# Patient Record
Sex: FEMALE | Race: WHITE | NOT HISPANIC OR LATINO | Employment: UNEMPLOYED | URBAN - METROPOLITAN AREA
[De-identification: names, ages, dates, MRNs, and addresses within clinical notes are randomized per-mention and may not be internally consistent; named-entity substitution may affect disease eponyms.]

---
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
## 2022-02-11 ENCOUNTER — OFFICE VISIT (OUTPATIENT)
Dept: FAMILY MEDICINE CLINIC | Facility: HOSPITAL | Age: 22
End: 2022-02-11
Payer: COMMERCIAL

## 2022-02-11 VITALS
BODY MASS INDEX: 22.09 KG/M2 | DIASTOLIC BLOOD PRESSURE: 58 MMHG | HEART RATE: 58 BPM | SYSTOLIC BLOOD PRESSURE: 92 MMHG | HEIGHT: 65 IN | WEIGHT: 132.6 LBS | TEMPERATURE: 98 F

## 2022-02-11 DIAGNOSIS — Z00.00 ANNUAL PHYSICAL EXAM: Primary | ICD-10-CM

## 2022-02-11 PROBLEM — M22.2X2 PATELLOFEMORAL DISORDER OF LEFT KNEE: Status: RESOLVED | Noted: 2019-10-11 | Resolved: 2022-02-11

## 2022-02-11 PROBLEM — S83.005A CLOSED DISLOCATION OF LEFT PATELLA: Status: RESOLVED | Noted: 2019-06-20 | Resolved: 2022-02-11

## 2022-02-11 PROCEDURE — 1036F TOBACCO NON-USER: CPT | Performed by: INTERNAL MEDICINE

## 2022-02-11 PROCEDURE — 3725F SCREEN DEPRESSION PERFORMED: CPT | Performed by: INTERNAL MEDICINE

## 2022-02-11 PROCEDURE — 3008F BODY MASS INDEX DOCD: CPT | Performed by: INTERNAL MEDICINE

## 2022-02-11 PROCEDURE — 99385 PREV VISIT NEW AGE 18-39: CPT | Performed by: INTERNAL MEDICINE

## 2022-02-11 NOTE — PATIENT INSTRUCTIONS

## 2022-02-11 NOTE — PROGRESS NOTES
[facility] [address]     NAME: Jose Miguel Gan  AGE: 25 y o  SEX: female  : 2000     DATE: 2022     Assessment and Plan:     Problem List Items Addressed This Visit     None      Visit Diagnoses     Annual physical exam    -  Primary          Immunizations and preventive care screenings were discussed with patient today  Appropriate education was printed on patient's after visit summary  Counseling:  Alcohol/drug use: discussed moderation in alcohol intake, the recommendations for healthy alcohol use, and avoidance of illicit drug use  Dental Health: discussed importance of regular tooth brushing, flossing, and dental visits  Injury prevention: discussed safety/seat belts, safety helmets, smoke detectors, carbon dioxide detectors, and smoking near bedding or upholstery  · Exercise: the importance of regular exercise/physical activity was discussed  Recommend exercise 3-5 times per week for at least 30 minutes  Depression Screening and Follow-up Plan: Patient was screened for depression during today's encounter  They screened negative with a PHQ-2 score of 0  Return in about 1 year (around 2023) for Annual physical      Chief Complaint:     Chief Complaint   Patient presents with    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    Diet and Physical Activity  · Diet/Nutrition: well balanced diet, limited junk food and consuming 3-5 servings of fruits/vegetables daily  · Exercise: does weights and a [employer] track        Depression Screening  PHQ-2/9 Depression Screening    Little interest or pleasure in doing things: 0 - not at all  Feeling down, depressed, or hopeless: 0 - not at all  PHQ-2 Score: 0  PHQ-2 Interpretation: Negative depression screen       General Health  · Sleep: sleeps well  · Hearing: normal - bilateral   · Vision: goes for regular eye exams and wears glasses  · Dental: regular dental visits, brushes teeth twice daily and flosses teeth occasionally  /GYN Health  · Last menstrual period: Jan 18th 2022 and regular  · Contraceptive method: oral contraceptives  · History of STDs?: no      Review of Systems:     Review of Systems   Constitutional: Negative for chills, fatigue, fever and unexpected weight change  HENT: Negative for congestion, hearing loss and trouble swallowing  Eyes: Negative for pain  Respiratory: Negative for cough, shortness of breath and wheezing  Cardiovascular: Negative for chest pain, palpitations and leg swelling  Gastrointestinal: Negative for abdominal pain, constipation, diarrhea, nausea and vomiting  Endocrine: Negative for polydipsia and polyuria  Genitourinary: Negative for difficulty urinating, dysuria and menstrual problem  Musculoskeletal: Negative for back pain and neck pain  Skin: Negative for rash and wound  Neurological: Negative for dizziness, light-headedness and headaches  Psychiatric/Behavioral: Negative for dysphoric mood and sleep disturbance  Past Medical History:     Past Medical History:   Diagnosis Date    Closed dislocation of left patella 6/20/2019    [facility] 10/19 No surgery recommended  PT, return in 8 weeks      Past Surgical History:     History reviewed  No pertinent surgical history     Social History:     Social History     Socioeconomic History    Marital status: Single     Spouse name: None    Number of children: None    Years of education: None    Highest education level: None   Occupational History    Occupation: student   Tobacco Use    Smoking status: Never Smoker    Smokeless tobacco: Never Used   Vaping Use    Vaping Use: Never used   Substance and Sexual Activity    Alcohol use: Never    Drug use: Never  Sexual activity: Yes     Partners: Male     Birth control/protection: Pill   Other Topics Concern    None   Social History Narrative    None     Social Determinants of Health     Financial Resource Strain: Not on file   Food Insecurity: Not on file   Transportation Needs: Not on file   Physical Activity: Not on file   Stress: Not on file   Social Connections: Not on file   Intimate Partner Violence: Not on file   Housing Stability: Not on file      Family History:     Family History   Problem Relation Age of Onset    Migraines Mother     Hyperlipidemia Father     Seizures Father     Atrial fibrillation Father     Asthma Maternal Grandmother     Anxiety disorder Maternal Grandmother     Asthma Maternal Grandfather     Clotting disorder Maternal Grandfather     Heart disease Maternal Grandfather     Leukemia Maternal Grandfather     Osteoporosis Maternal Grandfather     Hypertension Paternal Grandfather     Thyroid disease Paternal Grandfather     Depression Other     Diabetes Other     Vesicoureteral reflux Other     No Known Problems Brother     Depression Paternal Grandmother       Current Medications:     Current Outpatient Medications   Medication Sig Dispense Refill    norethindrone-ethinyl estradiol-iron (MICROGESTIN FE1 5/30) 1 5-30 MG-MCG tablet Take 1 tablet by mouth daily 84 tablet 3     No current facility-administered medications for this visit  Allergies:     No Known Allergies   Physical Exam:     BP 92/58   Pulse 58   Temp 98 °F (36 7 °C) (Tympanic)   Ht 5' 5" (1 651 m)   Wt 60 1 kg (132 lb 9 6 oz)   BMI 22 07 kg/m²     Physical Exam  Vitals and nursing note reviewed  Constitutional:       General: She is not in acute distress  Appearance: She is well-developed  She is not ill-appearing  HENT:      Head: Normocephalic and atraumatic  Right Ear: Tympanic membrane and external ear normal  There is no impacted cerumen        Left Ear: Tympanic membrane and external ear normal  There is no impacted cerumen  Eyes:      General:         Right eye: No discharge  Left eye: No discharge  Conjunctiva/sclera: Conjunctivae normal    Neck:      Thyroid: No thyromegaly  Trachea: No tracheal deviation  Comments: No thyroid nodules/masses noted  Cardiovascular:      Rate and Rhythm: Normal rate and regular rhythm  Heart sounds: Normal heart sounds  No murmur heard  Pulmonary:      Effort: Pulmonary effort is normal  No respiratory distress  Breath sounds: Normal breath sounds  No wheezing, rhonchi or rales  Abdominal:      General: There is no distension  Palpations: Abdomen is soft  Tenderness: There is no abdominal tenderness  There is no guarding or rebound  Musculoskeletal:         General: No deformity or signs of injury  Cervical back: Neck supple  Lymphadenopathy:      Cervical: No cervical adenopathy  Skin:     General: Skin is warm and dry  Coloration: Skin is not pale  Findings: No rash  Neurological:      General: No focal deficit present  Mental Status: She is alert  Motor: No abnormal muscle tone  Gait: Gait normal    Psychiatric:         Mood and Affect: Mood normal          Behavior: Behavior normal          Thought Content:  Thought content normal          Judgment: Judgment normal           Petra Res, DO   [address] [place]

## 2022-10-11 ENCOUNTER — TELEPHONE (OUTPATIENT)
Dept: OBGYN CLINIC | Facility: CLINIC | Age: 22
End: 2022-10-11

## 2022-10-11 DIAGNOSIS — Z30.41 ENCOUNTER FOR SURVEILLANCE OF CONTRACEPTIVE PILLS: ICD-10-CM

## 2022-10-12 ENCOUNTER — OFFICE VISIT (OUTPATIENT)
Dept: GASTROENTEROLOGY | Facility: CLINIC | Age: 22
End: 2022-10-12
Payer: COMMERCIAL

## 2022-10-12 ENCOUNTER — TELEPHONE (OUTPATIENT)
Dept: GASTROENTEROLOGY | Facility: CLINIC | Age: 22
End: 2022-10-12

## 2022-10-12 VITALS
WEIGHT: 132 LBS | DIASTOLIC BLOOD PRESSURE: 68 MMHG | SYSTOLIC BLOOD PRESSURE: 112 MMHG | BODY MASS INDEX: 21.99 KG/M2 | HEIGHT: 65 IN

## 2022-10-12 DIAGNOSIS — R19.8 RECTAL FULLNESS: ICD-10-CM

## 2022-10-12 DIAGNOSIS — K62.5 RECTAL BLEEDING: Primary | ICD-10-CM

## 2022-10-12 PROCEDURE — 99204 OFFICE O/P NEW MOD 45 MIN: CPT | Performed by: INTERNAL MEDICINE

## 2022-10-12 RX ORDER — NORETHINDRONE ACETATE AND ETHINYL ESTRADIOL 1.5-30(21)
1 KIT ORAL DAILY
Qty: 84 TABLET | Refills: 0 | Status: SHIPPED | OUTPATIENT
Start: 2022-10-12 | End: 2022-10-12

## 2022-10-12 RX ORDER — NORETHINDRONE ACETATE/ETHINYL ESTRADIOL AND FERROUS FUMARATE 1.5-30(21)
KIT ORAL
Qty: 84 TABLET | Refills: 3 | Status: SHIPPED | OUTPATIENT
Start: 2022-10-12

## 2022-10-12 RX ORDER — HYDROCORTISONE ACETATE 25 MG/1
25 SUPPOSITORY RECTAL 2 TIMES DAILY
Qty: 12 SUPPOSITORY | Refills: 0 | Status: SHIPPED | OUTPATIENT
Start: 2022-10-12

## 2022-10-12 NOTE — PROGRESS NOTES
[facility] - Outpatient Consultation  Stacy Dugan 25 y o  female MRN: [id number]  Encounter: [id number]    ASSESSMENT AND PLAN:      1  Rectal bleeding  This is a 68-year-old female referred to us by Dr Lino Lambert,  for rectal bleeding  She is here today with her mom as a new patient  Two days of bright red blood per rectum, sounds hemorrhoidal   No family history of colon cancer or inflammatory bowel disease  Will need to rule out any other rectal anal pathology given the constant sensation of rectal fullness  - avoid pushing and straining  - okay to try some Anusol for now  - will schedule colonoscopy  - add fiber and increased water    - hydrocortisone (ANUSOL-HC) 25 mg suppository; Insert 1 suppository (25 mg total) into the rectum 2 (two) times a day  Dispense: 12 suppository; Refill: 0  - Schedule Colonoscopy @ [address] [place] [address] Ox-Cit Acd 10-3 5-12 MG-GM -GM/160ML SOLN; Take 160 mL by mouth once for 1 dose Take 160 mL by mouth once for 2 doses as instructed  Dispense: 320 mL; Refill: 0    2  Rectal fullness      Followup Appointment:  6 weeks pending colonoscopy  ______________________________________________________________________    Chief Complaint   Patient presents with   • Rectal bleeding, hard stools     Referred by Dr Sunita Mar         HPI:   Stacy Dugan is a 25y o  year old female who presents today as a new patient at the request of her PCP for rectal bleeding  She is here today with her mom who is 1 of our nurse practitioners in the hematology department  Patient states that at baseline, she is healthy  Denies any issues with heartburn, dysphagia, nausea vomiting, abdominal pains  No chronic diarrhea or weight loss  Eats well  She is a college student at [employer]  Occasionally gets hard stools requiring some straining but normally moves her bowels regularly  In the past, she has seen some pink blood in the toilet paper or covered on her stool  However, yesterday she moves her bowels and there was significantly more amount of red blood in the toilet, on the toilet paper  Almost look like menstrual bleeding  Slightly improved today but still happened again  No abdominal pains associated with it  No recent sicknesses or diarrhea  No stress at school  Historical Information   Past Medical History:   Diagnosis Date   • Closed dislocation of left patella 6/20/2019    [facility] 10/[facility] No surgery recommended  PT, return in 8 weeks     History reviewed  No pertinent surgical history  Social History     Substance and Sexual Activity   Alcohol Use Never     Social History     Substance and Sexual Activity   Drug Use Never     Social History     Tobacco Use   Smoking Status Never Smoker   Smokeless Tobacco Never Used     Family History   Problem Relation Age of Onset   • Migraines Mother    • Hyperlipidemia Father    • Seizures Father    • Atrial fibrillation Father    • No Known Problems Brother    • Asthma Maternal Grandmother    • Anxiety disorder Maternal Grandmother    • Asthma Maternal Grandfather    • Clotting disorder Maternal Grandfather    • Heart disease Maternal Grandfather    • Leukemia Maternal Grandfather    • Osteoporosis Maternal Grandfather    • Depression Paternal Grandmother    • Hypertension Paternal Grandfather    • Thyroid disease Paternal Grandfather    • Depression Other    • Diabetes Other    • Vesicoureteral reflux Other    • Colon polyps Neg Hx    • Colon cancer Neg Hx        Meds/Allergies     Current Outpatient Medications:   •  hydrocortisone (ANUSOL-HC) 25 mg suppository  •  Dontrell Fe 1 5/30 1 5-30 MG-MCG tablet  •  Sod Picosulfate-Mag Ox-Cit Acd 10-3 5-12 MG-GM -GM/160ML SOLN    No Known Allergies    PHYSICAL EXAM:    Blood pressure 112/68, height 5' 5" (1 651 m), weight 59 9 kg (132 lb)  Body mass index is 21 97 kg/m²    General Appearance: NAD, cooperative, alert  Eyes: Anicteric, PERRLA, EOMI  ENT:  Normocephalic, atraumatic, normal mucosa  Neck:  Supple, symmetrical, trachea midline,   Resp:  Clear to auscultation bilaterally; no rales, rhonchi or wheezing; respirations unlabored   CV:  S1 S2, Regular rate and rhythm; no murmur, rub, or gallop  GI:  Soft, non-tender, non-distended; normal bowel sounds; no masses, no organomegaly   Rectal:  Normal tone  No dyssynergia  Grade 2 hemorrhoids  Small anterior fissure  Some hard mobile stool balls palpable on exam   Musculoskeletal: No cyanosis, clubbing or edema  Normal ROM  Skin:  No jaundice, rashes, or lesions   Heme/Lymph: No palpable cervical lymphadenopathy  Psych: Normal affect, good eye contact  Neuro: No gross deficits, AAOx3    Lab Results:   Lab Results   Component Value Date    WBC 14 86 (H) 12/19/2017    HGB 14 1 12/19/2017    HCT 41 4 12/19/2017    MCV 84 12/19/2017     12/19/2017     Lab Results   Component Value Date    K 3 7 12/19/2017     12/19/2017    CO2 27 12/19/2017    BUN 13 12/19/2017    CREATININE 0 68 12/19/2017    CALCIUM 9 3 12/19/2017     No results found for: IRON, TIBC, FERRITIN  No results found for: LIPASE    Radiology Results:   No results found  REVIEW OF SYSTEMS:    CONSTITUTIONAL: Denies any fever, chills, rigors, and weight loss  HEENT: No earache or tinnitus  Denies hearing loss or visual disturbances  CARDIOVASCULAR: No chest pain or palpitations  RESPIRATORY: Denies any cough, hemoptysis, shortness of breath or dyspnea on exertion  GASTROINTESTINAL: As noted in the History of Present Illness  GENITOURINARY: No problems with urination  Denies any hematuria or dysuria  NEUROLOGIC: No dizziness or vertigo, denies headaches  MUSCULOSKELETAL: Denies any muscle or joint pain  SKIN: Denies skin rashes or itching  ENDOCRINE: Denies excessive thirst  Denies intolerance to heat or cold    PSYCHOSOCIAL: Denies depression or anxiety  Denies any recent memory loss

## 2022-10-12 NOTE — TELEPHONE ENCOUNTER
Scheduled date of colonoscopy (as of today):12/01/22  Physician performing colonoscopy:Dr Joni Montiel and different doc due to school scheduling  Location of colonoscopy:[facility]  Bowel prep reviewed with patient:Margaret Ramirez  Instructions reviewed with patient by:Margaret Ramirez  Clearances: No

## 2022-10-13 DIAGNOSIS — Z30.41 ENCOUNTER FOR SURVEILLANCE OF CONTRACEPTIVE PILLS: ICD-10-CM

## 2022-10-13 RX ORDER — NORETHINDRONE ACETATE AND ETHINYL ESTRADIOL 1.5-30(21)
1 KIT ORAL DAILY
Qty: 84 TABLET | Refills: 0 | Status: CANCELLED | OUTPATIENT
Start: 2022-10-13

## 2022-12-01 ENCOUNTER — HOSPITAL ENCOUNTER (OUTPATIENT)
Dept: GASTROENTEROLOGY | Facility: AMBULATORY SURGERY CENTER | Age: 22
Discharge: HOME | End: 2022-12-01
Attending: INTERNAL MEDICINE

## 2022-12-01 ENCOUNTER — ANESTHESIA EVENT (OUTPATIENT)
Dept: GASTROENTEROLOGY | Facility: AMBULATORY SURGERY CENTER | Age: 22
End: 2022-12-01

## 2022-12-01 ENCOUNTER — ANESTHESIA (OUTPATIENT)
Dept: GASTROENTEROLOGY | Facility: AMBULATORY SURGERY CENTER | Age: 22
End: 2022-12-01

## 2022-12-01 VITALS
HEART RATE: 44 BPM | TEMPERATURE: 98 F | BODY MASS INDEX: 21.99 KG/M2 | HEIGHT: 65 IN | RESPIRATION RATE: 16 BRPM | DIASTOLIC BLOOD PRESSURE: 58 MMHG | OXYGEN SATURATION: 100 % | SYSTOLIC BLOOD PRESSURE: 103 MMHG | WEIGHT: 132 LBS

## 2022-12-01 DIAGNOSIS — K62.5 RECTAL BLEEDING: ICD-10-CM

## 2022-12-01 LAB
EXT PREGNANCY TEST URINE: NEGATIVE
EXT. CONTROL: NORMAL

## 2022-12-01 RX ORDER — SODIUM CHLORIDE, SODIUM LACTATE, POTASSIUM CHLORIDE, CALCIUM CHLORIDE 600; 310; 30; 20 MG/100ML; MG/100ML; MG/100ML; MG/100ML
50 INJECTION, SOLUTION INTRAVENOUS CONTINUOUS
Status: DISCONTINUED | OUTPATIENT
Start: 2022-12-01 | End: 2022-12-05 | Stop reason: HOSPADM

## 2022-12-01 RX ORDER — PROPOFOL 10 MG/ML
INJECTION, EMULSION INTRAVENOUS AS NEEDED
Status: DISCONTINUED | OUTPATIENT
Start: 2022-12-01 | End: 2022-12-01

## 2022-12-01 RX ADMIN — PROPOFOL 50 MG: 10 INJECTION, EMULSION INTRAVENOUS at 12:32

## 2022-12-01 RX ADMIN — PROPOFOL 50 MG: 10 INJECTION, EMULSION INTRAVENOUS at 12:37

## 2022-12-01 RX ADMIN — PROPOFOL 100 MG: 10 INJECTION, EMULSION INTRAVENOUS at 12:22

## 2022-12-01 RX ADMIN — SODIUM CHLORIDE, SODIUM LACTATE, POTASSIUM CHLORIDE, CALCIUM CHLORIDE 50 ML/HR: 600; 310; 30; 20 INJECTION, SOLUTION INTRAVENOUS at 11:28

## 2022-12-01 RX ADMIN — PROPOFOL 50 MG: 10 INJECTION, EMULSION INTRAVENOUS at 12:41

## 2022-12-01 RX ADMIN — PROPOFOL 100 MG: 10 INJECTION, EMULSION INTRAVENOUS at 12:27

## 2022-12-01 NOTE — H&P
History and Physical -  [facility]  Trevor Nair 23 y o  female MRN: [id number]    HPI: Trevor Nair is a 23y o  year old female who presents for colonoscopy to evaluate rectal bleeding    REVIEW OF SYSTEMS: Per the HPI, and otherwise unremarkable  Historical Information   Past Medical History:   Diagnosis Date   • Closed dislocation of left patella 6/20/2019    [facility] 10/19 No surgery recommended  PT, return in 8 weeks     History reviewed  No pertinent surgical history    Social History   Social History     Substance and Sexual Activity   Alcohol Use Never     Social History     Substance and Sexual Activity   Drug Use Never     Social History     Tobacco Use   Smoking Status Never   Smokeless Tobacco Never     Family History   Problem Relation Age of Onset   • Migraines Mother    • Hyperlipidemia Father    • Seizures Father    • Atrial fibrillation Father    • No Known Problems Brother    • Asthma Maternal Grandmother    • Anxiety disorder Maternal Grandmother    • Asthma Maternal Grandfather    • Clotting disorder Maternal Grandfather    • Heart disease Maternal Grandfather    • Leukemia Maternal Grandfather    • Osteoporosis Maternal Grandfather    • Depression Paternal Grandmother    • Hypertension Paternal Grandfather    • Thyroid disease Paternal Grandfather    • Depression Other    • Diabetes Other    • Vesicoureteral reflux Other    • Colon polyps Neg Hx    • Colon cancer Neg Hx        Meds/Allergies       Current Outpatient Medications:   •  hydrocortisone (ANUSOL-HC) 25 mg suppository  •  Dontrell Fe 1 5/30 1 5-30 MG-MCG tablet    Current Facility-Administered Medications:   •  lactated ringers infusion, 50 mL/hr, Intravenous, Continuous, 50 mL/hr at 12/01/22 1128    No Known Allergies    Objective     /61   Pulse (!) 52   Temp 98 °F (36 7 °C) (Temporal)   Resp 15   Ht 5' 5" (1 651 m)   Wt 59 9 kg (132 lb)   LMP 11/23/2022 (Approximate)   SpO2 100%   BMI 21 97 kg/m²     PHYSICAL EXAM    Gen: NAD AAOx3  Head: Normocephalic, Atraumatic  CV: S1S2 RRR no m/r/g  CHEST: Clear b/l no c/r/w  ABD: soft, +BS NT/ND  EXT: no edema    ASSESSMENT/PLAN:  This is a 23y o  year old female here for colonoscopy, and she is stable and optimized for her procedure

## 2022-12-01 NOTE — ANESTHESIA POSTPROCEDURE EVALUATION
Post-Op Assessment Note    CV Status:  Stable    Pain management: adequate     Mental Status:  Arousable and sleepy   Hydration Status:  Euvolemic   PONV Controlled:  Controlled   Airway Patency:  Patent      Post Op Vitals Reviewed: Yes      Staff: Anesthesiologist, CRNA         No notable events documented      BP      Temp      Pulse    Resp      SpO2

## 2022-12-16 ENCOUNTER — TELEPHONE (OUTPATIENT)
Dept: PSYCHIATRY | Facility: CLINIC | Age: 22
End: 2022-12-16

## 2022-12-16 NOTE — TELEPHONE ENCOUNTER
Pts mother phoned in and was looking for more information for Therapy anywhere for her daughter  Daughter is in college and this would benefit her  Pts mother is a [employer] employee  Please reach out to her regarding this when you can at ph# [phone]

## 2023-01-11 ENCOUNTER — OFFICE VISIT (OUTPATIENT)
Dept: FAMILY MEDICINE CLINIC | Facility: HOSPITAL | Age: 23
End: 2023-01-11

## 2023-01-11 VITALS
BODY MASS INDEX: 21.49 KG/M2 | DIASTOLIC BLOOD PRESSURE: 72 MMHG | HEART RATE: 80 BPM | SYSTOLIC BLOOD PRESSURE: 118 MMHG | HEIGHT: 65 IN | WEIGHT: 129 LBS | TEMPERATURE: 98.6 F

## 2023-01-11 DIAGNOSIS — F41.1 GAD (GENERALIZED ANXIETY DISORDER): Primary | ICD-10-CM

## 2023-01-11 RX ORDER — ESCITALOPRAM OXALATE 10 MG/1
10 TABLET ORAL DAILY
Qty: 30 TABLET | Refills: 1 | Status: SHIPPED | OUTPATIENT
Start: 2023-01-11

## 2023-01-11 NOTE — PROGRESS NOTES
Assessment/Plan:    ABNER (generalized anxiety disorder)  She reports mostly anxiety that is has been getting out of control  Some mild depressive symptoms  We discussed treatment options/AE/risks/benefits at length  She is willing to try lexapro  Start at 10 mg dose  She will be starting therapy tomorrow  We discussed increased risk of SI in her age group  Stop med and call if this occurs  F/U in 4 weeks  Diagnoses and all orders for this visit:    ABNER (generalized anxiety disorder)  -     escitalopram (Lexapro) 10 mg tablet; Take 1 tablet (10 mg total) by mouth daily          Subjective:      Patient ID: Veena Cheek is a 23 y o  female  For the last few months she has been feeling anxious  Mind can't turn off  Always thinking about the next thing and what she has to do  Denies any obsessive/compulsive traits but will pick at nails and cuticles  She does not feel she is depressed although her mother does and urged her to seek treatment  She is a college student studying nutrition  Reports her grades are good and does not feel her anxiety is affecting her grades  Feels her anxiety with being on time actually drives her success  She feels she has always been on the anxious side but noticed it becoming worse the last few months  She thinks it started after a break up with her boyfriend  She denies ever being treated for anxiety or depression  She has her first therapy session scheduled for tomorrow  The following portions of the patient's history were reviewed and updated as appropriate: allergies, current medications, past family history, past medical history, past social history, past surgical history and problem list     Review of Systems   Constitutional: Positive for fatigue  Negative for appetite change and unexpected weight change  Psychiatric/Behavioral: Positive for dysphoric mood  Negative for sleep disturbance and suicidal ideas  The patient is nervous/anxious  Objective:  Vitals:    01/11/23 1257   BP: 118/72   Pulse: 80   Temp: 98 6 °F (37 °C)      Physical Exam  Constitutional:       Appearance: Normal appearance  Cardiovascular:      Rate and Rhythm: Normal rate and regular rhythm  Heart sounds: Normal heart sounds  No murmur heard  Pulmonary:      Effort: Pulmonary effort is normal       Breath sounds: Normal breath sounds  Skin:     General: Skin is warm and dry  Neurological:      Mental Status: She is alert and oriented to person, place, and time  Psychiatric:         Mood and Affect: Affect is tearful  Behavior: Behavior normal          Thought Content:  Thought content normal          Judgment: Judgment normal

## 2023-01-12 ENCOUNTER — TELEMEDICINE (OUTPATIENT)
Dept: PSYCHIATRY | Facility: CLINIC | Age: 23
End: 2023-01-12

## 2023-01-12 DIAGNOSIS — F41.1 GAD (GENERALIZED ANXIETY DISORDER): Primary | ICD-10-CM

## 2023-01-12 NOTE — PSYCH
Assessment/Plan: Diagnoses and all orders for this visit:    ABNER (generalized anxiety disorder)      Patient ID: Minh Durham is a 23 y o  female    Subjective:   Annette Rehman states feeling anxious due to 'overthinking'  She also "obsessively plans" her day, meals, outfits, etc  Annette Rehman shared she went through a recent break-up with her boyfriend of three years  The break-up was unexpected  She feels she has lost her best friend but admits he may not have been the best person for her  Morena mood presented as within normal limits and affect as normal range and intensity, appropriate  PHQ9 score in mild range, GAD7 in moderate range  Problem Assessment:   Annette Rehman  reports that the primary symptoms reported today that are causing distress:  feeling nervous/anxious  Annette Rehman identified goal as "to cope and reduce over thinking"      History of Present Illness (HPI):  Symptoms began: about a year ago; worse since break up with boyfriend  Severity of symptoms impacted by: recent break up  Pre-morbid level of function and History of Present Illness:   Previous Psychiatric/psychological treatment/year: none   Current Psychiatrist/Therapist: none but has been started on Lexapro 10mg by her PCP  Outpatient and/or Partial and Other Community Resources Used (CTT, ICM, VNA): none          SOCIAL/VOCATION:  Family Constellation (include parents, relationship with each and pertinent Psych/Medical History, addiction/mental illness):   Family History   Problem Relation Age of Onset   • Migraines Mother    • Hyperlipidemia Father    • Seizures Father    • Atrial fibrillation Father    • No Known Problems Brother    • Asthma Maternal Grandmother    • Anxiety disorder Maternal Grandmother    • Asthma Maternal Grandfather    • Clotting disorder Maternal Grandfather    • Heart disease Maternal Grandfather    • Leukemia Maternal Grandfather    • Osteoporosis Maternal Grandfather    • Depression Paternal Grandmother • Hypertension Paternal Grandfather    • Thyroid disease Paternal Grandfather    • Depression Other    • Diabetes Other    • Vesicoureteral reflux Other    • Colon polyps Neg Hx    • Colon cancer Neg Hx         Familial Relationships:  Family of origin history: good family life, stable, grateful for parents who has good marriage  Mother:  to father  Father:  to mother  Sibling(s):  One older brother, good relationship  Spouse: none  Children: none       Melida Holcomb  relates best to her father  Melida Holcomb  lives with parents, brother  Additional Comments related to family/relationships/peer support: Melida Holcomb reports Her support to include family  Domestic Violence: No past history of domestic violence and There is no history of child abuse  Melida Holcomb  denies experiencing domestic violence : N/A  Past History of traumatic events / losses / grief: none    School or Work History (strengths/limitations/needs): in college at [email or web address]; works at [employer], teaches dance also      Highest grade level achieved: [employer] , in [address] history includes N/A         LEISURE ASSESSMENT   (Include past and present hobbies/interests and level of involvement (Ex: Group/Club Affiliations): dance, reading   Morena's primary language is [place]  Preferred language is [place]  Ethnic and cultural considerations are: none  Religions affiliations and level of involvement: Melida Holcomb  reports belief in [place] 1825   Does spirituality help you cope? Yes      FUNCTIONAL STATUS: There has been a recent change in Melida Holcomb ability to do the following: none     Level of Assistance Needed/By Whom?: n/a           SUBSTANCE ABUSE ASSESSMENT: no substance abuse       DETOX HISTORY: n/a     Previous detox/rehab treatment: n/a       HEALTH ASSESSMENT: no referral to PCP needed        LEGAL:  Past legal history: Melida Holcomb denies having a history of arrest, retirement/FPC or DUI’s                Delivery History: N/A    Developmental Milestones: N/A     Risk Assessment:   The following ratings are based on my interview(s) with Morena     Risk of Harm to Self:   Demographic risk factors include   Historical Risk Factors include none  Recent Specific Risk Factors include none  Additional Factors for a Child or Adolescent n/a  Ramírez Aguiar  denies current or history of self harm or suicidal ideations  Risk of Harm to Others:   Demographic Risk Factors include none  Historical Risk Factors include none  Recent Specific Risk Factors include none  Ramírez Aguiar  denies current or history of homicidal ideations  Access to Weapons:   Ramírez Aguiar  denies access to weapons: N/A  The following steps have been taken to ensure weapons are properly secured: N/A      Based on the above information, the client presents the following risk of harm to self or others:  low     The following interventions are recommended:   no intervention changes      Notes regarding this Risk Assessment: (Please make sure to fill this out fully depending on the level of risk and if that risk is medium or high--how are you looking to maintain safety)   Ramírez Aguiar   is of Minimal risk @  does not have a history of suicide attempts, suicidal ideation, or self-injurious behaviors  Ramírez Aguiar stated N/A  Ramírez Aguiar  does not have a history of having homicidal ideations        Review Of Systems:     Mood Normal   Behavior Normal    Thought Content Normal   General Normal    Personality Normal   Other Psych Symptoms Normal   Constitutional As Noted in HPI   ENT As Noted in HPI   Cardiovascular As Noted in HPI   Respiratory As Noted in HPI   Gastrointestinal As Noted in HPI   Genitourinary As Noted in HPI   Musculoskeletal As Noted in HPI   Integumentary As Noted in HPI   Neurological As Noted in HPI   Endocrine Normal             Mental status:  Appearance calm and cooperative  and good eye contact    Mood euthymic   Affect affect appropriate    Speech a normal rate   Thought Processes normal thought processes   Hallucinations no hallucinations present    Thought Content no delusions   Abnormal Thoughts no suicidal thoughts  and no homicidal thoughts    Orientation  oriented to person, oriented to place and oriented to time   Remote Memory short term memory intact and long term memory intact   Attention Span concentration intact   Intellect Appears to be of Average Intelligence   Fund of Knowledge displays adequate knowledge of current events   Insight Insight intact   Judgement judgment was intact                        Virtual Regular Visit    Verification of patient location:    Patient is located in the following state in which I hold an active license [place]      Assessment/Plan:    Problem List Items Addressed This Visit        Other    ABNER (generalized anxiety disorder) - Primary       Goals addressed in session: Goal 1          Reason for visit is   Chief Complaint   Patient presents with   • Virtual Regular Visit        Encounter provider Tatyana Mulligan LCSW    Provider located at [address] [id number] [phone]      Recent Visits  No visits were found meeting these conditions  Showing recent visits within past 7 days and meeting all other requirements  Today's Visits  Date Type Provider Dept   01/12/23 Telemedicine Tatyana Mulligan LCSW [facility]   Showing today's visits and meeting all other requirements  Future Appointments  No visits were found meeting these conditions  Showing future appointments within next 150 days and meeting all other requirements       The patient was identified by name and date of birth  Stacy Dugan was informed that this is a telemedicine visit and that the visit is being conducted throughthe [employer] platform  She agrees to proceed     My office door was closed  No one else was in the room  She acknowledged consent and understanding of privacy and security of the video platform  The patient has agreed to participate and understands they can discontinue the visit at any time  Patient is aware this is a billable service  Subjective  Lucretia Ortega is a 23 y o  female   HPI     Past Medical History:   Diagnosis Date   • Closed dislocation of left patella 6/20/2019    [facility] 10/19 No surgery recommended  PT, return in 8 weeks       No past surgical history on file  Current Outpatient Medications   Medication Sig Dispense Refill   • escitalopram (Lexapro) 10 mg tablet Take 1 tablet (10 mg total) by mouth daily 30 tablet 1   • Dontrell Fe 1 5/30 1 5-30 MG-MCG tablet take 1 tablet by mouth once daily 84 tablet 3     No current facility-administered medications for this visit  No Known Allergies    Review of Systems    Video Exam    There were no vitals filed for this visit      Physical Exam     01/12/23  Start Time: 1104  Stop Time: 2265  Total Visit Time: 41 minutes

## 2023-01-12 NOTE — BH TREATMENT PLAN
Chucho Moreno  2000       Date of Initial Treatment Plan: 1/12/23   Date of Current Treatment Plan: 01/12/23    Treatment Plan Number 1     Strengths/Personal Resources for Self Care: motivated     Diagnosis:   1  ABNER (generalized anxiety disorder)            Area of Needs: to better manage anxiety      Long Term Goal 1: "to cope better and reduce over thinking"   To better manage anxiety as evidenced by decrease in at least one severity level on ABNER-7  Target Date: 6/12/23  Completion Date: tbd     Short Term Objectives for Goal  1  Identify 3-4  stressors contributing to anxiety and 3-4 healthy ways to manage anxiety, as well as begin utilizing them  2  Engage in discussion of the Cognitive Process with clinician  3  Identify 2-3 Cognitive Distortions often used that reinforce anxiety and engage in Reframing Cognitive Distortions  4  Engage in Problem Solving as needed  5  Improve communication skills as needed  6  Clinician to provide Psychoeducation as needed  7  Teach DBT skills as appropriate  Long Term Goal 2: N/A    Target Date: N/A  Completion Date: N/A    Short Term Objectives for Goal 2: N/A         Long Term Goal # 3: N/A     Target Date: N/A  Completion Date: N/A    Short Term Objectives for Goal 3: N/A    GOAL 1: Modality: Individual 2x per month   Completion Date tbd    GOAL 2: Modality: n/a     GOAL 3: Modality: n/a       Behavioral Health Treatment Plan [facility] [place]: Diagnosis and Treatment Plan explained to Hollace Bernheim relates understanding diagnosis and is agreeable to Treatment Plan  Client Comments : Please share your thoughts, feelings, need and/or experiences regarding your treatment plan: Lia Krueger agreed to tx plan outline  Chucho Moreno, 2000, actively participated in the creation of this treatment plan during a virtual session, using the [employer] platform     Chucho Moreno  provided verbal consent on 1/12/2023 at 1150 AM  The treatment plan was transcribed into the [facility] at a later time

## 2023-01-27 ENCOUNTER — TELEMEDICINE (OUTPATIENT)
Dept: PSYCHIATRY | Facility: CLINIC | Age: 23
End: 2023-01-27

## 2023-01-27 DIAGNOSIS — F41.1 GAD (GENERALIZED ANXIETY DISORDER): Primary | ICD-10-CM

## 2023-01-27 NOTE — PSYCH
Behavioral Health Psychotherapy Progress Note    Psychotherapy Provided: Individual Psychotherapy     1  ABNER (generalized anxiety disorder)            Goals addressed in session: Goal 1     DATA: Puneet Mckeon shared she is feeling more isolated  She has returned to school but is commuting so she is not as involved on campus  Two of her close friends have left school and she comes home to an empty house because her parents are at work  Talked with her about reaching out to some acquaintances to hang out  Also suggested she make a point to be out of the house on weekends  Made a few other suggestions such as finding something to look forward to, making plans and challenging negative thoughts  Introduced self soothing techniques/Mindfulness  Sent reference sheets on Mindfulness and distress tolerance skills  During this session, this clinician used the following therapeutic modalities: Dialectical Behavior Therapy, Mindfulness-based Strategies and Supportive Psychotherapy    Substance Abuse was not addressed during this session  If the client is diagnosed with a co-occurring substance use disorder, please indicate any changes in the frequency or amount of use: n/a  Stage of change for addressing substance use diagnoses: No substance use/Not applicable    ASSESSMENT:  Marc Vora presents with a Euthymic/ normal mood  her affect is Normal range and intensity, which is congruent, with her mood and the content of the session  The client has not made progress on their goals (early engagement)  Marc Vora presents with a low risk of suicide, low risk of self-harm, and low risk of harm to others  For any risk assessment that surpasses a "low" rating, a safety plan must be developed  A safety plan was indicated: no  If yes, describe in detail n/a    PLAN: Between sessions, Marc Vora will review information on Mindfulness and distress tolerance skills   At the next session, the therapist will use Dialectical Behavior Therapy, Mindfulness-based Strategies and Supportive Psychotherapy to address anxiety  Behavioral Health Treatment Plan and Discharge Planning: Vikas Tian is aware of and agrees to continue to work on their treatment plan  They have identified and are working toward their discharge goals  yes    Visit start and stop times:    01/27/23  Start Time: 1010  Stop Time: 1047  Total Visit Time: 37 minutes    Virtual Regular Visit    Verification of patient location:    Patient is located in the following state in which I hold an active license [place]      Assessment/Plan:    Problem List Items Addressed This Visit        Other    ABNER (generalized anxiety disorder) - Primary       Goals addressed in session: Goal 1          Reason for visit is   Chief Complaint   Patient presents with   • Virtual Regular Visit        Encounter provider Coleman Robbins LCSW    Provider located at [address] [place] [id number] [phone]      Recent Visits  No visits were found meeting these conditions  Showing recent visits within past 7 days and meeting all other requirements  Today's Visits  Date Type Provider Dept   01/27/23 Telemedicine Coleman Robbins LCSW [facility]   Showing today's visits and meeting all other requirements  Future Appointments  No visits were found meeting these conditions  Showing future appointments within next 150 days and meeting all other requirements       The patient was identified by name and date of birth  Vikas Tian was informed that this is a telemedicine visit and that the visit is being conducted throughthe [employer] platform  She agrees to proceed     My office door was closed  No one else was in the room  She acknowledged consent and understanding of privacy and security of the video platform   The patient has agreed to participate and understands they can discontinue the visit at any time  Patient is aware this is a billable service  Subjective  Chioma Curiel is a 23 y o  female   HPI     Past Medical History:   Diagnosis Date   • Closed dislocation of left patella 6/20/2019    [facility] 10/19 No surgery recommended  PT, return in 8 weeks       No past surgical history on file  Current Outpatient Medications   Medication Sig Dispense Refill   • escitalopram (Lexapro) 10 mg tablet Take 1 tablet (10 mg total) by mouth daily 30 tablet 1   • Dontrell Fe 1 5/30 1 5-30 MG-MCG tablet take 1 tablet by mouth once daily 84 tablet 3     No current facility-administered medications for this visit  No Known Allergies    Review of Systems    Video Exam    There were no vitals filed for this visit      Physical Exam

## 2023-02-10 ENCOUNTER — TELEPHONE (OUTPATIENT)
Dept: PSYCHIATRY | Facility: CLINIC | Age: 23
End: 2023-02-10

## 2023-02-16 ENCOUNTER — DOCUMENTATION (OUTPATIENT)
Dept: PSYCHIATRY | Facility: CLINIC | Age: 23
End: 2023-02-16

## 2023-02-16 ENCOUNTER — OFFICE VISIT (OUTPATIENT)
Dept: FAMILY MEDICINE CLINIC | Facility: HOSPITAL | Age: 23
End: 2023-02-16

## 2023-02-16 ENCOUNTER — TELEPHONE (OUTPATIENT)
Dept: FAMILY MEDICINE CLINIC | Facility: HOSPITAL | Age: 23
End: 2023-02-16

## 2023-02-16 VITALS
HEART RATE: 76 BPM | TEMPERATURE: 98 F | SYSTOLIC BLOOD PRESSURE: 102 MMHG | BODY MASS INDEX: 20.6 KG/M2 | HEIGHT: 66 IN | OXYGEN SATURATION: 99 % | WEIGHT: 128.2 LBS | DIASTOLIC BLOOD PRESSURE: 62 MMHG

## 2023-02-16 DIAGNOSIS — Z00.00 ANNUAL PHYSICAL EXAM: ICD-10-CM

## 2023-02-16 DIAGNOSIS — Z13.220 SCREENING CHOLESTEROL LEVEL: ICD-10-CM

## 2023-02-16 DIAGNOSIS — F32.0 CURRENT MILD EPISODE OF MAJOR DEPRESSIVE DISORDER WITHOUT PRIOR EPISODE (HCC): ICD-10-CM

## 2023-02-16 DIAGNOSIS — F41.1 GAD (GENERALIZED ANXIETY DISORDER): Primary | ICD-10-CM

## 2023-02-16 DIAGNOSIS — Z11.59 ENCOUNTER FOR HEPATITIS C SCREENING TEST FOR LOW RISK PATIENT: ICD-10-CM

## 2023-02-16 DIAGNOSIS — Z11.4 SCREENING FOR HIV (HUMAN IMMUNODEFICIENCY VIRUS): ICD-10-CM

## 2023-02-16 NOTE — TELEPHONE ENCOUNTER
Patient did Blippar testing today   Ordered and will have [facility] pick it up  Confirmation number [id number]

## 2023-02-16 NOTE — PROGRESS NOTES
Psychotherapy Discharge Summary    Preferred Name: Katie Stuart  YOB: 2000    Admission date to psychotherapy: 1/12/23    Referred by: self    Presenting Problem: anxiety    Course of treatment included : individual therapy  and BH assessment    Progress/Outcome of Treatment Goals (brief summary of course of treatment) minimal progress made as Talya Rizvi requested discharge after two sessions because she prefers to try in person therapy  We were able to discuss Mindfulness and DBT skills during second session  Treatment Complications (if any): none    Treatment Progress: fair    Current [facility] Provider: none    Discharge Medications include: Lexapro    Discharge Date: 1/27/23    Discharge Diagnosis:   1  ABNER (generalized anxiety disorder)            Criteria for Discharge: need to be transferred to another service/level of care within the system; prefers in person therapy  Aftercare recommendations include (include specific referral names and phone numbers, if appropriate): transfer to in person therapy       Prognosis: fair

## 2023-02-16 NOTE — ASSESSMENT & PLAN NOTE
Poor response to lexapro with AE not well tolerated  We discussed Genesight testing and she is agreeable  Will obtain swab today  Hold off on any new med until results are reviewed  Will also check blood work to r/o underlying cause to symptoms

## 2023-02-16 NOTE — PATIENT INSTRUCTIONS

## 2023-02-16 NOTE — PROGRESS NOTES
Assessment/Plan:    ABNER (generalized anxiety disorder)  Poor response to lexapro with AE not well tolerated  We discussed Genesight testing and she is agreeable  Will obtain swab today  Hold off on any new med until results are reviewed  Will also check blood work to r/o underlying cause to symptoms  Current mild episode of major depressive disorder without prior episode ([place] [id number] )  See above plan  Diagnoses and all orders for this visit:    ABNER (generalized anxiety disorder)  -     CBC and differential; Future  -     Comprehensive metabolic panel; Future  -     TSH, 3rd generation with Free T4 reflex; Future    Annual physical exam    Current mild episode of major depressive disorder without prior episode ([place] [id number] )  -     CBC and differential; Future  -     Comprehensive metabolic panel; Future  -     TSH, 3rd generation with Free T4 reflex; Future  -     Vitamin D 1,25 dihydroxy; Future    Screening cholesterol level  -     Lipid panel; Future    Encounter for hepatitis C screening test for low risk patient  -     Hepatitis C antibody; Future    Screening for HIV (human immunodeficiency virus)  -     HIV 1/2 AG/AB W REFLEX [facility] and [facility] only; Future          Subjective:      Patient ID: Katie Stuart is a 23 y o  female  Was started with lexapro  Not effective  Caused headaches and nausea  She has stopped it  Still with lingering HA  She is feeling more depressed  Still anxious  Family h/o thyroid issues  No issues with sleeping so much but does not feel rested  Feels tired all the time  The following portions of the patient's history were reviewed and updated as appropriate: allergies, current medications, past family history, past medical history, past social history, past surgical history and problem list     Review of Systems   Constitutional: Positive for fatigue  Negative for activity change, appetite change, chills, diaphoresis, fever and unexpected weight change  HENT: Negative  Negative for congestion, dental problem, ear pain, hearing loss, postnasal drip, rhinorrhea, sinus pressure, sinus pain, sore throat, tinnitus and trouble swallowing  Eyes: Negative  Respiratory: Negative  Cardiovascular: Negative  Gastrointestinal: Positive for nausea  Negative for abdominal distention, abdominal pain, anal bleeding, blood in stool, constipation, diarrhea, rectal pain and vomiting  Endocrine: Negative  Genitourinary: Negative  Musculoskeletal: Negative  Skin: Negative  Allergic/Immunologic: Negative  Neurological: Positive for headaches  Negative for dizziness, seizures, syncope, facial asymmetry, speech difficulty, weakness, light-headedness and numbness  Hematological: Negative  Psychiatric/Behavioral: Positive for agitation, decreased concentration, dysphoric mood and sleep disturbance  Negative for suicidal ideas  The patient is nervous/anxious  Objective:  Vitals:    02/16/23 0859   BP: 102/62   Pulse: 76   Temp: 98 °F (36 7 °C)   SpO2: 99%      Physical Exam  Vitals reviewed  Constitutional:       Appearance: Normal appearance  She is well-developed and normal weight  HENT:      Head: Normocephalic and atraumatic  Right Ear: Tympanic membrane, ear canal and external ear normal       Left Ear: Tympanic membrane, ear canal and external ear normal       Nose: Nose normal       Mouth/Throat:      Mouth: Mucous membranes are moist       Pharynx: Oropharynx is clear  Eyes:      Conjunctiva/sclera: Conjunctivae normal       Pupils: Pupils are equal, round, and reactive to light  Neck:      Thyroid: No thyromegaly  Cardiovascular:      Rate and Rhythm: Normal rate and regular rhythm  Heart sounds: Normal heart sounds  No murmur heard  Pulmonary:      Effort: Pulmonary effort is normal       Breath sounds: Normal breath sounds  Abdominal:      General: Abdomen is flat  Bowel sounds are normal       Palpations: Abdomen is soft   There is no hepatomegaly or splenomegaly  Tenderness: There is no abdominal tenderness  Musculoskeletal:         General: Normal range of motion  Cervical back: Normal range of motion and neck supple  Lymphadenopathy:      Cervical: No cervical adenopathy  Skin:     General: Skin is warm and dry  Neurological:      Mental Status: She is alert and oriented to person, place, and time  Psychiatric:         Mood and Affect: Mood normal          Behavior: Behavior normal          Thought Content: Thought content normal          Judgment: Judgment normal        Depression Screening Follow-up Plan: Patient's depression screening was positive with a PHQ-2 score of 3  Their PHQ-9 score was 9  Patient assessed for underlying major depression  They have no active suicidal ideations  Brief counseling provided and recommend additional follow-up/re-evaluation next office visit

## 2023-02-16 NOTE — PROGRESS NOTES
[address] [facility] PRIMARY CARE [address]     NAME: Eduardo Melvin  AGE: 23 y o  SEX: female  : 2000     DATE: 2023     Assessment and Plan:     Problem List Items Addressed This Visit        Other    ABNER (generalized anxiety disorder) - Primary   Other Visit Diagnoses     Annual physical exam              Immunizations and preventive care screenings were discussed with patient today  Appropriate education was printed on patient's after visit summary  Counseling:  Alcohol/drug use: discussed moderation in alcohol intake, the recommendations for healthy alcohol use, and avoidance of illicit drug use  Dental Health: discussed importance of regular tooth brushing, flossing, and dental visits  Injury prevention: discussed safety/seat belts, safety helmets, smoke detectors, carbon dioxide detectors, and smoking near bedding or upholstery  Sexual health: discussed sexually transmitted diseases, partner selection, use of condoms, avoidance of unintended pregnancy, and contraceptive alternatives  · Exercise: the importance of regular exercise/physical activity was discussed  Recommend exercise 3-5 times per week for at least 30 minutes  No follow-ups on file  Chief Complaint:     Chief Complaint   Patient presents with   • Physical Exam      History of Present Illness:     Adult Annual Physical   Patient here for a comprehensive physical exam  The patient reports no problems  Diet and Physical Activity  · Diet/Nutrition: well balanced diet  · Exercise: 5-7 times a week on average        Depression Screening  PHQ-2/9 Depression Screening    Little interest or pleasure in doing things: 1 - several days  Feeling down, depressed, or hopeless: 2 - more than half the days  Trouble falling or staying asleep, or sleeping too much: 0 - not at all  Feeling tired or having little energy: 3 - nearly every day  Poor appetite or overeatin - not at all  Feeling bad about yourself - or that you are a failure or have let yourself or your family down: 2 - more than half the days  Trouble concentrating on things, such as reading the newspaper or watching television: 1 - several days  Moving or speaking so slowly that other people could have noticed  Or the opposite - being so fidgety or restless that you have been moving around a lot more than usual: 0 - not at all  Thoughts that you would be better off dead, or of hurting yourself in some way: 0 - not at all  PHQ-2 Score: 3  PHQ-2 Interpretation: POSITIVE depression screen  PHQ-9 Score: 9   PHQ-9 Interpretation: Mild depression        General Health  · Sleep: sleeps well  · Hearing: normal - bilateral   · Vision: goes for regular eye exams  · Dental: regular dental visits  /GYN Health  · Last menstrual period: 2023  · Contraceptive method: oral contraceptives  · History of STDs?: no      Review of Systems:     Review of Systems   Constitutional: Positive for fatigue  Negative for activity change, appetite change, chills, diaphoresis, fever and unexpected weight change  HENT: Negative  Eyes: Negative  Respiratory: Negative  Cardiovascular: Negative  Gastrointestinal: Positive for nausea  Negative for abdominal distention, abdominal pain, anal bleeding, blood in stool, constipation, diarrhea, rectal pain and vomiting  Endocrine: Negative  Genitourinary: Negative  Musculoskeletal: Negative  Skin: Negative  Neurological: Positive for headaches  Negative for dizziness, tremors, seizures, syncope, facial asymmetry, speech difficulty, weakness, light-headedness and numbness  Hematological: Negative  Psychiatric/Behavioral: Positive for agitation, decreased concentration, dysphoric mood and sleep disturbance  Negative for suicidal ideas  The patient is nervous/anxious         Past Medical History:     Past Medical History:   Diagnosis Date   • Closed dislocation of left patella 6/20/2019    [facility] 10/19 No surgery recommended  PT, return in 8 weeks      Past Surgical History:     History reviewed  No pertinent surgical history     Social History:     Social History     Socioeconomic History   • Marital status: Single     Spouse name: None   • Number of children: None   • Years of education: None   • Highest education level: None   Occupational History   • Occupation: student   Tobacco Use   • Smoking status: Never   • Smokeless tobacco: Never   Vaping Use   • Vaping Use: Never used   Substance and Sexual Activity   • Alcohol use: Never   • Drug use: Never   • Sexual activity: Yes     Partners: Male     Birth control/protection: Pill   Other Topics Concern   • None   Social History Narrative   • None     Social Determinants of Health     Financial Resource Strain: Not on file   Food Insecurity: Not on file   Transportation Needs: Not on file   Physical Activity: Not on file   Stress: Not on file   Social Connections: Not on file   Intimate Partner Violence: Not on file   Housing Stability: Not on file      Family History:     Family History   Problem Relation Age of Onset   • Migraines Mother    • Hyperlipidemia Father    • Seizures Father    • Atrial fibrillation Father    • No Known Problems Brother    • Asthma Maternal Grandmother    • Anxiety disorder Maternal Grandmother    • Asthma Maternal Grandfather    • Clotting disorder Maternal Grandfather    • Heart disease Maternal Grandfather    • Leukemia Maternal Grandfather    • Osteoporosis Maternal Grandfather    • Depression Paternal Grandmother    • Hypertension Paternal Grandfather    • Thyroid disease Paternal Grandfather    • Depression Other    • Diabetes Other    • Vesicoureteral reflux Other    • Colon polyps Neg Hx    • Colon cancer Neg Hx       Current Medications:     Current Outpatient Medications   Medication Sig Dispense Refill   • Dontrell Fe 1 5/30 1 5-30 MG-MCG tablet take 1 tablet by mouth once daily 84 tablet 3   • escitalopram (Lexapro) 10 mg tablet Take 1 tablet (10 mg total) by mouth daily (Patient not taking: Reported on 2/16/2023) 30 tablet 1     No current facility-administered medications for this visit  Allergies:     No Known Allergies   Physical Exam:     /62   Pulse 76   Temp 98 °F (36 7 °C)   Ht 5' 5 75" (1 67 m)   Wt 58 2 kg (128 lb 3 2 oz)   SpO2 99%   BMI 20 85 kg/m²     Physical Exam  Vitals reviewed  Constitutional:       Appearance: Normal appearance  She is normal weight  HENT:      Head: Normocephalic and atraumatic  Right Ear: Tympanic membrane, ear canal and external ear normal       Left Ear: Tympanic membrane, ear canal and external ear normal       Nose: Nose normal       Mouth/Throat:      Mouth: Mucous membranes are moist       Pharynx: Oropharynx is clear  Eyes:      Conjunctiva/sclera: Conjunctivae normal       Pupils: Pupils are equal, round, and reactive to light  Cardiovascular:      Rate and Rhythm: Normal rate and regular rhythm  Heart sounds: Normal heart sounds  No murmur heard  Pulmonary:      Effort: Pulmonary effort is normal       Breath sounds: Normal breath sounds  Abdominal:      General: Abdomen is flat  Bowel sounds are normal       Palpations: Abdomen is soft  There is no hepatomegaly or splenomegaly  Tenderness: There is no abdominal tenderness  Musculoskeletal:         General: Normal range of motion  Cervical back: Normal range of motion and neck supple  Skin:     General: Skin is warm and dry  Capillary Refill: Capillary refill takes less than 2 seconds  Neurological:      General: No focal deficit present  Mental Status: She is alert and oriented to person, place, and time  Psychiatric:         Mood and Affect: Mood normal          Behavior: Behavior normal          Thought Content:  Thought content normal          Judgment: Judgment normal           Mode [address] [place] [address]

## 2023-02-17 ENCOUNTER — APPOINTMENT (OUTPATIENT)
Dept: LAB | Facility: HOSPITAL | Age: 23
End: 2023-02-17

## 2023-02-17 DIAGNOSIS — Z11.4 SCREENING FOR HIV (HUMAN IMMUNODEFICIENCY VIRUS): ICD-10-CM

## 2023-02-17 DIAGNOSIS — Z11.59 ENCOUNTER FOR HEPATITIS C SCREENING TEST FOR LOW RISK PATIENT: ICD-10-CM

## 2023-02-17 DIAGNOSIS — Z13.220 SCREENING CHOLESTEROL LEVEL: ICD-10-CM

## 2023-02-17 DIAGNOSIS — F32.0 CURRENT MILD EPISODE OF MAJOR DEPRESSIVE DISORDER WITHOUT PRIOR EPISODE (HCC): ICD-10-CM

## 2023-02-17 DIAGNOSIS — F41.1 GAD (GENERALIZED ANXIETY DISORDER): ICD-10-CM

## 2023-02-17 LAB
ALBUMIN SERPL BCP-MCNC: 3.9 G/DL (ref 3.5–5)
ALP SERPL-CCNC: 48 U/L (ref 46–116)
ALT SERPL W P-5'-P-CCNC: 26 U/L (ref 12–78)
ANION GAP SERPL CALCULATED.3IONS-SCNC: 7 MMOL/L (ref 4–13)
AST SERPL W P-5'-P-CCNC: 22 U/L (ref 5–45)
BASOPHILS # BLD AUTO: 0.03 THOUSANDS/ÂΜL (ref 0–0.1)
BASOPHILS NFR BLD AUTO: 1 % (ref 0–1)
BILIRUB SERPL-MCNC: 0.45 MG/DL (ref 0.2–1)
BUN SERPL-MCNC: 14 MG/DL (ref 5–25)
CALCIUM SERPL-MCNC: 9.4 MG/DL (ref 8.3–10.1)
CHLORIDE SERPL-SCNC: 105 MMOL/L (ref 96–108)
CHOLEST SERPL-MCNC: 201 MG/DL
CO2 SERPL-SCNC: 25 MMOL/L (ref 21–32)
CREAT SERPL-MCNC: 0.82 MG/DL (ref 0.6–1.3)
EOSINOPHIL # BLD AUTO: 0.17 THOUSAND/ÂΜL (ref 0–0.61)
EOSINOPHIL NFR BLD AUTO: 4 % (ref 0–6)
ERYTHROCYTE [DISTWIDTH] IN BLOOD BY AUTOMATED COUNT: 12 % (ref 11.6–15.1)
GFR SERPL CREATININE-BSD FRML MDRD: 103 ML/MIN/1.73SQ M
GLUCOSE P FAST SERPL-MCNC: 83 MG/DL (ref 65–99)
HCT VFR BLD AUTO: 45.9 % (ref 34.8–46.1)
HCV AB SER QL: NORMAL
HDLC SERPL-MCNC: 66 MG/DL
HGB BLD-MCNC: 15 G/DL (ref 11.5–15.4)
IMM GRANULOCYTES # BLD AUTO: 0.02 THOUSAND/UL (ref 0–0.2)
IMM GRANULOCYTES NFR BLD AUTO: 0 % (ref 0–2)
LDLC SERPL CALC-MCNC: 113 MG/DL (ref 0–100)
LYMPHOCYTES # BLD AUTO: 1.84 THOUSANDS/ÂΜL (ref 0.6–4.47)
LYMPHOCYTES NFR BLD AUTO: 40 % (ref 14–44)
MCH RBC QN AUTO: 29.8 PG (ref 26.8–34.3)
MCHC RBC AUTO-ENTMCNC: 32.7 G/DL (ref 31.4–37.4)
MCV RBC AUTO: 91 FL (ref 82–98)
MONOCYTES # BLD AUTO: 0.34 THOUSAND/ÂΜL (ref 0.17–1.22)
MONOCYTES NFR BLD AUTO: 7 % (ref 4–12)
NEUTROPHILS # BLD AUTO: 2.2 THOUSANDS/ÂΜL (ref 1.85–7.62)
NEUTS SEG NFR BLD AUTO: 48 % (ref 43–75)
NONHDLC SERPL-MCNC: 135 MG/DL
NRBC BLD AUTO-RTO: 0 /100 WBCS
PLATELET # BLD AUTO: 302 THOUSANDS/UL (ref 149–390)
PMV BLD AUTO: 10.4 FL (ref 8.9–12.7)
POTASSIUM SERPL-SCNC: 4.4 MMOL/L (ref 3.5–5.3)
PROT SERPL-MCNC: 7.4 G/DL (ref 6.4–8.4)
RBC # BLD AUTO: 5.04 MILLION/UL (ref 3.81–5.12)
SODIUM SERPL-SCNC: 137 MMOL/L (ref 135–147)
TRIGL SERPL-MCNC: 110 MG/DL
TSH SERPL DL<=0.05 MIU/L-ACNC: 1.35 UIU/ML (ref 0.45–4.5)
WBC # BLD AUTO: 4.6 THOUSAND/UL (ref 4.31–10.16)

## 2023-02-18 LAB
HIV 1+2 AB+HIV1 P24 AG SERPL QL IA: NORMAL
HIV 2 AB SERPL QL IA: NORMAL
HIV1 AB SERPL QL IA: NORMAL
HIV1 P24 AG SERPL QL IA: NORMAL

## 2023-02-20 ENCOUNTER — TELEPHONE (OUTPATIENT)
Dept: PSYCHIATRY | Facility: CLINIC | Age: 23
End: 2023-02-20

## 2023-02-20 LAB — 1,25(OH)2D3 SERPL-MCNC: 45.9 PG/ML (ref 24.8–81.5)

## 2023-02-20 NOTE — TELEPHONE ENCOUNTER
DISCHARGE LETTER for Andrea Gaytan LCSW (certified and regular) placed in outgoing mail on 02/20/23      Article #:  Katia Charles [id number]    Address:  [address] [place], [place] [id number]

## 2023-02-21 ENCOUNTER — ANNUAL EXAM (OUTPATIENT)
Dept: OBGYN CLINIC | Facility: CLINIC | Age: 23
End: 2023-02-21

## 2023-02-21 VITALS
DIASTOLIC BLOOD PRESSURE: 74 MMHG | HEIGHT: 65 IN | BODY MASS INDEX: 20.83 KG/M2 | WEIGHT: 125 LBS | SYSTOLIC BLOOD PRESSURE: 120 MMHG

## 2023-02-21 DIAGNOSIS — Z30.41 ENCOUNTER FOR SURVEILLANCE OF CONTRACEPTIVE PILLS: ICD-10-CM

## 2023-02-21 DIAGNOSIS — Z01.419 ENCOUNTER FOR ANNUAL ROUTINE GYNECOLOGICAL EXAMINATION: Primary | ICD-10-CM

## 2023-02-21 RX ORDER — NORETHINDRONE ACETATE AND ETHINYL ESTRADIOL 1.5-30(21)
1 KIT ORAL DAILY
Qty: 84 TABLET | Refills: 3 | Status: SHIPPED | OUTPATIENT
Start: 2023-02-21

## 2023-02-21 NOTE — PROGRESS NOTES
Assessment/Plan:    1  Encounter for annual routine gynecological examination      2  Encounter for surveillance of contraceptive pills    - norethindrone-ethinyl estradiol-iron (Dontrell Fe 1 5/30) 1 5-30 MG-MCG tablet; Take 1 tablet by mouth daily  Dispense: 84 tablet; Refill: 3        Subjective      Ce Mayo is a 23 y o  female who presents for annual exam  Periods are regular every 28 days, lasting 3-4 days  Dysmenorrhea:mild, occurring first 1-2 days of flow  Cyclic symptoms:  none  No intermenstrual bleeding, spotting, or discharge  She has no STD concerns  Current contraception: OCP (estrogen/progesterone)  History of abnormal Pap smear: no  Regular self breast exam: yes  History of abnormal mammogram: no  History of abnormal lipids: no    Menstrual History:  Nulligravida  Patient's last menstrual period was 02/14/2023 (exact date)  Period Cycle (Days): 28  Period Duration (Days): 3-4  Period Pattern: Regular  Menstrual Flow: Light  Menstrual Control: Tampon  Menstrual Control Change Freq (Hours): 4  Dysmenorrhea: (!) Mild  Dysmenorrhea Symptoms: Cramping    Past Medical History:   Diagnosis Date   • Closed dislocation of left patella 6/20/2019    [facility] 10/19 No surgery recommended   PT, return in 8 weeks       Family History   Problem Relation Age of Onset   • Migraines Mother    • Hyperlipidemia Father    • Seizures Father    • Atrial fibrillation Father    • No Known Problems Brother    • Asthma Maternal Grandmother    • Anxiety disorder Maternal Grandmother    • Asthma Maternal Grandfather    • Clotting disorder Maternal Grandfather    • Heart disease Maternal Grandfather    • Leukemia Maternal Grandfather    • Osteoporosis Maternal Grandfather    • Depression Paternal Grandmother    • Hypertension Paternal Grandfather    • Thyroid disease Paternal Grandfather    • Depression Other    • Diabetes Other    • Vesicoureteral reflux Other    • Colon polyps Neg Hx    • Colon cancer Neg Hx The following portions of the patient's history were reviewed and updated as appropriate: allergies, current medications, past family history, past medical history, past social history, past surgical history and problem list     Review of Systems  Pertinent items are noted in HPI  Objective      /74 (BP Location: Right arm, Patient Position: Sitting, Cuff Size: Standard)   Ht 5' 5 25" (1 657 m)   Wt 56 7 kg (125 lb)   LMP 02/14/2023 (Exact Date)   BMI 20 64 kg/m²     General:   alert and oriented, in no acute distress   Heart:  Breasts: regular rate and rhythm   appear normal, no suspicious masses, no skin or nipple changes or axillary nodes     Lungs: effort normal   Abdomen: soft, non-tender, without masses or organomegaly   Vulva: normal   Vagina: normal mucosa   Cervix: no lesions   Uterus: normal size, mobile, non-tender   Adnexa: normal adnexa and no mass, fullness, tenderness

## 2023-02-22 ENCOUNTER — OFFICE VISIT (OUTPATIENT)
Dept: FAMILY MEDICINE CLINIC | Facility: HOSPITAL | Age: 23
End: 2023-02-22

## 2023-02-22 VITALS
WEIGHT: 130.4 LBS | SYSTOLIC BLOOD PRESSURE: 108 MMHG | OXYGEN SATURATION: 98 % | DIASTOLIC BLOOD PRESSURE: 64 MMHG | HEIGHT: 63 IN | BODY MASS INDEX: 23.11 KG/M2 | HEART RATE: 60 BPM | TEMPERATURE: 97 F

## 2023-02-22 DIAGNOSIS — F41.1 GAD (GENERALIZED ANXIETY DISORDER): Primary | ICD-10-CM

## 2023-02-22 DIAGNOSIS — F32.0 CURRENT MILD EPISODE OF MAJOR DEPRESSIVE DISORDER WITHOUT PRIOR EPISODE (HCC): ICD-10-CM

## 2023-02-22 RX ORDER — FLUOXETINE 10 MG/1
10 TABLET, FILM COATED ORAL DAILY
Qty: 30 TABLET | Refills: 1 | Status: SHIPPED | OUTPATIENT
Start: 2023-02-22

## 2023-02-22 NOTE — ASSESSMENT & PLAN NOTE
We reviewed results of Genesight testing  There are no significant drug gene interactions  Lexapro may cause increased serum levels so this could be why she did not tolerate it  We reviewed list of medications that can be used as directed  Will start on fluoxetine  We discussed AE and risk of increased SI in her age group  She will call with any questions or worsening mood  F/U in 4 weeks

## 2023-02-22 NOTE — PROGRESS NOTES
Assessment/Plan:    ABNER (generalized anxiety disorder)  We reviewed results of Genesight testing  There are no significant drug gene interactions  Lexapro may cause increased serum levels so this could be why she did not tolerate it  We reviewed list of medications that can be used as directed  Will start on fluoxetine  We discussed AE and risk of increased SI in her age group  She will call with any questions or worsening mood  F/U in 4 weeks  Current [facility] of major depressive [facility]  See above plan  Diagnoses and all orders for this visit:    ABNER (generalized anxiety disorder)  -     FLUoxetine (PROzac) 10 MG tablet; Take 1 tablet (10 mg total) by mouth daily    Current mild episode of major depressive disorder without prior episode (HCC)  -     FLUoxetine (PROzac) 10 MG tablet; Take 1 tablet (10 mg total) by mouth daily          Subjective:      Patient ID: Ce Mayo is a 23 y o  female  Pt with anxiety and depression  Anxiety being predominant symptoms  She was put on lexapro and had intolerable side effects and overall did not think it helped her mood  Genesight testing was done and she is here to review the results  The following portions of the patient's history were reviewed and updated as appropriate: allergies, current medications, past family history, past medical history, past social history, past surgical history and problem list     Review of Systems   Psychiatric/Behavioral: Positive for dysphoric mood  The patient is nervous/anxious  Objective:  Vitals:    02/22/23 1518   BP: 108/64   Pulse: 60   Temp: (!) 97 °F (36 1 °C)   SpO2: 98%      Physical Exam  Vitals reviewed  Constitutional:       Appearance: Normal appearance  Cardiovascular:      Rate and Rhythm: Normal rate and regular rhythm  Heart sounds: Normal heart sounds     Pulmonary:      Effort: Pulmonary effort is normal    Neurological: Mental Status: She is alert and oriented to person, place, and time  Psychiatric:         Mood and Affect: Mood normal          Behavior: Behavior normal          Thought Content:  Thought content normal          Judgment: Judgment normal

## 2023-03-22 ENCOUNTER — OFFICE VISIT (OUTPATIENT)
Dept: FAMILY MEDICINE CLINIC | Facility: HOSPITAL | Age: 23
End: 2023-03-22

## 2023-03-22 VITALS
HEART RATE: 60 BPM | BODY MASS INDEX: 23.21 KG/M2 | DIASTOLIC BLOOD PRESSURE: 60 MMHG | HEIGHT: 63 IN | WEIGHT: 131 LBS | TEMPERATURE: 97.2 F | OXYGEN SATURATION: 99 % | SYSTOLIC BLOOD PRESSURE: 110 MMHG

## 2023-03-22 DIAGNOSIS — F32.0 CURRENT MILD EPISODE OF MAJOR DEPRESSIVE DISORDER WITHOUT PRIOR EPISODE (HCC): ICD-10-CM

## 2023-03-22 DIAGNOSIS — F41.1 GAD (GENERALIZED ANXIETY DISORDER): Primary | ICD-10-CM

## 2023-03-22 NOTE — ASSESSMENT & PLAN NOTE
ABNER-7=7  Improvement  Tolerating fluoxetine  Would like to stay on current dose  She will message me if she needs increase  F/U in 3 months

## 2023-03-22 NOTE — PROGRESS NOTES
Assessment/Plan:    Current mild episode of major depressive disorder without prior episode ([facility] )  Doing very well with fluoxetine  Improved depression symptoms  Wants to stay on current dose  She will message me if she changes her mind  F/U in 3 months    ABNER (generalized anxiety disorder)  ABNER-7=7  Improvement  Tolerating fluoxetine  Would like to stay on current dose  She will message me if she needs increase  F/U in 3 months  Diagnoses and all orders for this visit:    ABNER (generalized anxiety disorder)    Current mild episode of major depressive disorder without prior episode ([facility] )          Subjective:      Patient ID: Stephanie Sal is a 23 y o  female  Depression is much better  Anxiety is not as bad and controlling  Reports her parents have seen an improvement in her mood  Still in therapy  Tolerating fluoxetine w/o issues  Would like to stay on current dose  The following portions of the patient's history were reviewed and updated as appropriate: allergies, current medications, past family history, past medical history, past social history, past surgical history and problem list     Review of Systems   Constitutional: Positive for fatigue  Negative for appetite change  Psychiatric/Behavioral: Positive for agitation and dysphoric mood  Negative for decreased concentration, sleep disturbance and suicidal ideas  The patient is nervous/anxious  Objective:  Vitals:    03/22/23 1559   BP: 110/60   Pulse: 60   Temp: (!) 97 2 °F (36 2 °C)   SpO2: 99%      Physical Exam  Vitals reviewed  Constitutional:       Appearance: Normal appearance  She is well-developed and normal weight  Cardiovascular:      Rate and Rhythm: Normal rate and regular rhythm  Heart sounds: Normal heart sounds  No murmur heard  Pulmonary:      Effort: Pulmonary effort is normal       Breath sounds: Normal breath sounds  Skin:     General: Skin is warm and dry     Neurological:      Mental Status: She is alert and oriented to person, place, and time  Psychiatric:         Mood and Affect: Mood normal          Behavior: Behavior normal          Thought Content:  Thought content normal          Judgment: Judgment normal

## 2023-03-22 NOTE — ASSESSMENT & PLAN NOTE
Doing very well with fluoxetine  Improved depression symptoms  Wants to stay on current dose  She will message me if she changes her mind     F/U in 3 months

## 2023-03-28 DIAGNOSIS — F41.1 GAD (GENERALIZED ANXIETY DISORDER): ICD-10-CM

## 2023-03-28 DIAGNOSIS — F32.0 CURRENT MILD EPISODE OF MAJOR DEPRESSIVE DISORDER WITHOUT PRIOR EPISODE (HCC): ICD-10-CM

## 2023-03-28 RX ORDER — FLUOXETINE 20 MG/1
20 TABLET, FILM COATED ORAL DAILY
Qty: 30 TABLET | Refills: 1 | Status: SHIPPED | OUTPATIENT
Start: 2023-03-28 | End: 2023-05-21

## 2023-04-26 ENCOUNTER — OFFICE VISIT (OUTPATIENT)
Dept: FAMILY MEDICINE CLINIC | Facility: HOSPITAL | Age: 23
End: 2023-04-26

## 2023-04-26 VITALS
SYSTOLIC BLOOD PRESSURE: 106 MMHG | WEIGHT: 131.2 LBS | HEIGHT: 65 IN | TEMPERATURE: 97.2 F | OXYGEN SATURATION: 99 % | DIASTOLIC BLOOD PRESSURE: 62 MMHG | HEART RATE: 64 BPM | BODY MASS INDEX: 21.86 KG/M2

## 2023-04-26 DIAGNOSIS — F32.0 CURRENT MILD EPISODE OF MAJOR DEPRESSIVE DISORDER WITHOUT PRIOR EPISODE (HCC): Primary | ICD-10-CM

## 2023-04-26 DIAGNOSIS — F41.1 GAD (GENERALIZED ANXIETY DISORDER): ICD-10-CM

## 2023-04-26 NOTE — PROGRESS NOTES
Assessment/Plan:    Current mild episode of major depressive disorder without prior episode ([facility])  PHQ-9=0  Well controlled  Continue on current regimen  F/U in 3-4 months    ABNER (generalized anxiety disorder)  ABNER-7=3  Controlled  F/U in 3-4 months  Diagnoses and all orders for this visit:    Current mild episode of major depressive disorder without prior episode ([facility] )    ABNER (generalized anxiety disorder)          Subjective:      Patient ID: Kevin Henao is a 23 y o  female  Mood is good with increase in prozac dose  Anxiety is much better  Having night sweats for the last two weeks  No other SE  Wants to continue on current dose  The following portions of the patient's history were reviewed and updated as appropriate: allergies, current medications, past family history, past medical history, past social history, past surgical history and problem list     Review of Systems   Constitutional: Positive for diaphoresis (night sweats)  Negative for appetite change  Psychiatric/Behavioral: Negative for agitation, decreased concentration, dysphoric mood, sleep disturbance and suicidal ideas  The patient is nervous/anxious  Objective:  Vitals:    04/26/23 1600   BP: 106/62   Pulse: 64   Temp: (!) 97 2 °F (36 2 °C)   SpO2: 99%      Physical Exam  Vitals reviewed  Constitutional:       Appearance: Normal appearance  Cardiovascular:      Rate and Rhythm: Normal rate and regular rhythm  Heart sounds: Normal heart sounds  No murmur heard  Pulmonary:      Effort: Pulmonary effort is normal       Breath sounds: Normal breath sounds  Skin:     General: Skin is warm and dry  Neurological:      Mental Status: She is alert and oriented to person, place, and time  Psychiatric:         Mood and Affect: Mood normal          Behavior: Behavior normal          Thought Content:  Thought content normal          Judgment: Judgment normal

## 2023-05-08 ENCOUNTER — OFFICE VISIT (OUTPATIENT)
Dept: URGENT CARE | Facility: CLINIC | Age: 23
End: 2023-05-08

## 2023-05-08 VITALS
HEART RATE: 102 BPM | BODY MASS INDEX: 22.13 KG/M2 | TEMPERATURE: 98.1 F | WEIGHT: 133 LBS | RESPIRATION RATE: 16 BRPM | OXYGEN SATURATION: 99 %

## 2023-05-08 DIAGNOSIS — S61.213A LACERATION OF LEFT MIDDLE FINGER WITHOUT FOREIGN BODY WITHOUT DAMAGE TO NAIL, INITIAL ENCOUNTER: ICD-10-CM

## 2023-05-08 DIAGNOSIS — S61.012A LACERATION OF LEFT THUMB WITHOUT FOREIGN BODY WITHOUT DAMAGE TO NAIL, INITIAL ENCOUNTER: Primary | ICD-10-CM

## 2023-05-08 DIAGNOSIS — S61.215A LACERATION OF LEFT RING FINGER WITHOUT FOREIGN BODY WITHOUT DAMAGE TO NAIL, INITIAL ENCOUNTER: ICD-10-CM

## 2023-05-08 NOTE — PATIENT INSTRUCTIONS
Keep wounds clean and dry  Do not peel off the glue - you may trim it with little scissors as it starts to peel away  You may apply topical antibiotic ointment or bacitracin over the sutures to your thumb  Follow up in 10 days for suture removal     Recheck immediately for any redness, swelling, purulent or pus discharge, or increasing pain  Go to the ER for worsening symptoms

## 2023-05-08 NOTE — PROGRESS NOTES
[facility]        NAME: Bita Pierre is a 23 y o  female  : 2000    MRN: [id number]  DATE: May 9, 2023  TIME: 6:29 PM    Assessment and Plan   Laceration of left thumb without foreign body without damage to nail, initial encounter [S61 012A]  1  Laceration of left thumb without foreign body without damage to nail, initial encounter  Tdap Vaccine greater than or equal to 6yo      2  Laceration of left middle finger without foreign body without damage to nail, initial encounter        3  Laceration of left ring finger without foreign body without damage to nail, initial encounter              Patient Instructions     Keep wounds clean and dry  Do not peel off the glue - you may trim it with little scissors as it starts to peel away  You may apply topical antibiotic ointment or bacitracin over the sutures to your thumb  Follow up in 10 days for suture removal     Recheck immediately for any redness, swelling, purulent or pus discharge, or increasing pain  Go to the ER for worsening symptoms  Chief Complaint     Chief Complaint   Patient presents with   • Finger Laceration     PT presents with multiple lacerations on thumb and fingers of left hand after cutting on a can today  History of Present Illness       This is a Annie female who presents for evaluation of multiple superficial lacerations to her left hand  Patient reports she accidentally cut her thumb, middle finger, and ring finger while opening up a can of coconut milk  Occurred just PTA  Bleeding controlled on arrival  Denies numbness/tingling and weakness of the hand  Last tetanus in 2014  Review of Systems   Review of Systems   Constitutional: Negative for fever  Respiratory: Negative for shortness of breath  Cardiovascular: Negative for chest pain  Skin: Positive for wound         Current Medications       Current Outpatient Medications:   •  FLUoxetine (PROzac) 20 MG tablet, Take 1 tablet (20 mg total) by mouth daily, Disp: 30 tablet, Rfl: 1  •  norethindrone-ethinyl estradiol-iron (Dontrell Fe 1 5/30) 1 5-30 MG-MCG tablet, Take 1 tablet by mouth daily, Disp: 84 tablet, Rfl: 3    Current Allergies     Allergies as of 05/08/2023   • (No Known Allergies)            The following portions of the patient's history were reviewed and updated as appropriate: allergies, current medications, past family history, past medical history, past social history, past surgical history and problem list      Past Medical History:   Diagnosis Date   • Closed dislocation of left patella 6/20/2019    [facility] 10/19 No surgery recommended  PT, return in 8 weeks       History reviewed  No pertinent surgical history  Family History   Problem Relation Age of Onset   • Migraines Mother    • Hyperlipidemia Father    • Seizures Father    • Atrial fibrillation Father    • No Known Problems Brother    • Asthma Maternal Grandmother    • Anxiety disorder Maternal Grandmother    • Asthma Maternal Grandfather    • Clotting disorder Maternal Grandfather    • Heart disease Maternal Grandfather    • Leukemia Maternal Grandfather    • Osteoporosis Maternal Grandfather    • Depression Paternal Grandmother    • Hypertension Paternal Grandfather    • Thyroid disease Paternal Grandfather    • Depression Other    • Diabetes Other    • Vesicoureteral reflux Other    • Colon polyps Neg Hx    • Colon cancer Neg Hx          Medications have been verified  Objective   Pulse 102   Temp 98 1 °F (36 7 °C)   Resp 16   Wt 60 3 kg (133 lb)   SpO2 99%   BMI 22 13 kg/m²          Physical Exam     Physical Exam  Vitals and nursing note reviewed  Constitutional:       General: She is not in acute distress  Appearance: Normal appearance  HENT:      Head: Normocephalic        Right Ear: External ear normal       Left Ear: External ear normal       Nose: Nose normal       Mouth/Throat:      Mouth: Mucous membranes are moist    Eyes: Conjunctiva/sclera: Conjunctivae normal    Cardiovascular:      Rate and Rhythm: Normal rate and regular rhythm  Pulses: Normal pulses  Heart sounds: Normal heart sounds  Pulmonary:      Effort: Pulmonary effort is normal       Breath sounds: Normal breath sounds  Musculoskeletal:         General: Normal range of motion  Skin:     General: Skin is warm and dry  Capillary Refill: Capillary refill takes less than 2 seconds  Findings: Laceration present  Comments: 2 5cm laceration to distal phalanx of left thumb  1 5cm laceration over DIP joint of left middle finger  1 5cm laceration to distal phalanx of left ring finger  No active bleeding  FROM left hand and phalanges  Sensation intact  +2 radial pulse  Neurological:      Mental Status: She is alert and oriented to person, place, and time  Laceration repair  Performed by: Giovanna Cabot, CRNP  Authorized by: Giovanna Cabot, CRNP   Consent: Verbal consent obtained  Risks and benefits: risks, benefits and alternatives were discussed  Consent given by: patient  Patient understanding: patient states understanding of the procedure being performed  Patient identity confirmed: verbally with patient  Body area: upper extremity  Location details: left thumb  Laceration length: 2 5 cm  Foreign bodies: no foreign bodies  Tendon involvement: none  Nerve involvement: none  Vascular damage: no  Anesthesia: local infiltration    Anesthesia:  Local Anesthetic: lidocaine 1% without epinephrine    Wound Dehiscence:  Superficial Wound Dehiscence: simple closure      Procedure Details:  Preparation: Patient was prepped and draped in the usual sterile fashion    Irrigation solution: saline (wound cleanser)  Irrigation method: jet lavage and syringe  Amount of cleaning: standard  Skin closure: 5-0 nylon  Technique: simple  Approximation: close  Approximation difficulty: simple  Dressing: antibiotic ointment  Patient tolerance: patient tolerated the procedure well with no immediate complications  Comments: Glue applied to lacerations of left middle and ring fingers

## 2023-05-20 DIAGNOSIS — F41.1 GAD (GENERALIZED ANXIETY DISORDER): ICD-10-CM

## 2023-05-20 DIAGNOSIS — F32.0 CURRENT MILD EPISODE OF MAJOR DEPRESSIVE DISORDER WITHOUT PRIOR EPISODE (HCC): ICD-10-CM

## 2023-05-21 RX ORDER — FLUOXETINE 20 MG/1
TABLET, FILM COATED ORAL
Qty: 30 TABLET | Refills: 1 | Status: SHIPPED | OUTPATIENT
Start: 2023-05-21

## 2023-05-26 ENCOUNTER — TELEPHONE (OUTPATIENT)
Dept: FAMILY MEDICINE CLINIC | Facility: HOSPITAL | Age: 23
End: 2023-05-26

## 2023-05-26 NOTE — TELEPHONE ENCOUNTER
pt's dad called and would like a letter of  medical necessity sent to his insurance company for the genetic his daughter had done   He is sending the information in the pt's my chart

## 2023-06-08 ENCOUNTER — TELEPHONE (OUTPATIENT)
Dept: OBGYN CLINIC | Facility: CLINIC | Age: 23
End: 2023-06-08

## 2023-06-08 NOTE — TELEPHONE ENCOUNTER
----- Message from Audrey Ma sent at 6/7/2023  3:15 PM EDT -----  Regarding: Uncomfortable period pain  Contact: [phone]  Good afternoon Dr Hemanth Alicea,   I started my period yesterday and I am having uncomfortable pain in my upper stomach region and nausea  It started yesterday afternoon and I am still having it  This has never happened before to this extent and I just wanted to see if there was anything I could do for it, Thanks!

## 2023-06-26 DIAGNOSIS — F41.1 GAD (GENERALIZED ANXIETY DISORDER): ICD-10-CM

## 2023-06-26 DIAGNOSIS — F32.0 CURRENT MILD EPISODE OF MAJOR DEPRESSIVE DISORDER WITHOUT PRIOR EPISODE (HCC): ICD-10-CM

## 2023-06-26 RX ORDER — FLUOXETINE 20 MG/1
20 TABLET, FILM COATED ORAL DAILY
Qty: 30 TABLET | Refills: 0 | Status: SHIPPED | OUTPATIENT
Start: 2023-06-26

## 2023-07-26 ENCOUNTER — OFFICE VISIT (OUTPATIENT)
Dept: FAMILY MEDICINE CLINIC | Facility: HOSPITAL | Age: 23
End: 2023-07-26
Payer: COMMERCIAL

## 2023-07-26 VITALS
SYSTOLIC BLOOD PRESSURE: 110 MMHG | DIASTOLIC BLOOD PRESSURE: 62 MMHG | BODY MASS INDEX: 21.76 KG/M2 | HEIGHT: 65 IN | OXYGEN SATURATION: 99 % | WEIGHT: 130.6 LBS | TEMPERATURE: 97.8 F | HEART RATE: 84 BPM

## 2023-07-26 DIAGNOSIS — F41.1 GAD (GENERALIZED ANXIETY DISORDER): ICD-10-CM

## 2023-07-26 DIAGNOSIS — F32.0 CURRENT MILD EPISODE OF MAJOR DEPRESSIVE DISORDER WITHOUT PRIOR EPISODE (HCC): Primary | ICD-10-CM

## 2023-07-26 PROCEDURE — 99213 OFFICE O/P EST LOW 20 MIN: CPT | Performed by: NURSE PRACTITIONER

## 2023-07-26 RX ORDER — FLUOXETINE 20 MG/1
20 TABLET, FILM COATED ORAL DAILY
Qty: 90 TABLET | Refills: 1 | Status: SHIPPED | OUTPATIENT
Start: 2023-07-26

## 2023-07-26 NOTE — PROGRESS NOTES
Assessment/Plan:    ABNER (generalized anxiety disorder)  ABNER-7=1  Very well controlled. Requesting to stay on 20 mg prozac. She will call or message me with any worsening mood. F/U in 6-8 months and will update PE at that time. Current mild episode of major depressive disorder without prior episode (HCC)  PHQ-9=0  Excellent control. Continue on 20 mg Prozac. Diagnoses and all orders for this visit:    Current mild episode of major depressive disorder without prior episode (HCC)  -     FLUoxetine (PROzac) 20 MG tablet; Take 1 tablet (20 mg total) by mouth daily    ABNER (generalized anxiety disorder)  -     FLUoxetine (PROzac) 20 MG tablet; Take 1 tablet (20 mg total) by mouth daily          Subjective:      Patient ID: Vanessa Urbina is a 23 y.o. female. Doing very well. Requesting to stay on 20 mg prozac. No AE. Will be returning to [address] [place] [address] in August.       The following portions of the patient's history were reviewed and updated as appropriate: allergies, current medications, past family history, past medical history, past social history, past surgical history and problem list.    Review of Systems   Constitutional: Negative for appetite change, fatigue and unexpected weight change. Psychiatric/Behavioral: Negative for agitation, decreased concentration, dysphoric mood, sleep disturbance and suicidal ideas. The patient is nervous/anxious. Objective:  Vitals:    07/26/23 1058   BP: 110/62   Pulse: 84   Temp: 97.8 °F (36.6 °C)   SpO2: 99%      Physical Exam  Vitals reviewed. Constitutional:       Appearance: Normal appearance. Cardiovascular:      Rate and Rhythm: Normal rate and regular rhythm. Heart sounds: Normal heart sounds. No murmur heard. Pulmonary:      Effort: Pulmonary effort is normal.      Breath sounds: Normal breath sounds. Skin:     General: Skin is warm and dry.    Neurological:      Mental Status: She is alert and oriented to person, place, and time. Psychiatric:         Mood and Affect: Mood normal.         Behavior: Behavior normal.         Thought Content:  Thought content normal.         Judgment: Judgment normal.

## 2023-07-26 NOTE — ASSESSMENT & PLAN NOTE
ABNER-7=1  Very well controlled. Requesting to stay on 20 mg prozac. She will call or message me with any worsening mood. F/U in 6-8 months and will update PE at that time.

## 2023-09-07 DIAGNOSIS — K64.9 BLEEDING HEMORRHOID: Primary | ICD-10-CM

## 2023-09-07 RX ORDER — HYDROCORTISONE ACETATE 25 MG/1
25 SUPPOSITORY RECTAL 2 TIMES DAILY
Qty: 12 SUPPOSITORY | Refills: 0 | Status: SHIPPED | OUTPATIENT
Start: 2023-09-07

## 2023-10-09 DIAGNOSIS — J06.9 UPPER RESPIRATORY TRACT INFECTION, UNSPECIFIED TYPE: Primary | ICD-10-CM

## 2023-10-09 RX ORDER — AZITHROMYCIN 250 MG/1
TABLET, FILM COATED ORAL
Qty: 6 TABLET | Refills: 0 | Status: SHIPPED | OUTPATIENT
Start: 2023-10-09 | End: 2023-10-13

## 2023-10-23 DIAGNOSIS — F32.0 CURRENT MILD EPISODE OF MAJOR DEPRESSIVE DISORDER WITHOUT PRIOR EPISODE (HCC): ICD-10-CM

## 2023-10-23 DIAGNOSIS — Z30.41 ENCOUNTER FOR SURVEILLANCE OF CONTRACEPTIVE PILLS: ICD-10-CM

## 2023-10-23 DIAGNOSIS — F41.1 GAD (GENERALIZED ANXIETY DISORDER): ICD-10-CM

## 2023-10-23 RX ORDER — FLUOXETINE 20 MG/1
20 TABLET, FILM COATED ORAL DAILY
Qty: 90 TABLET | Refills: 1 | Status: SHIPPED | OUTPATIENT
Start: 2023-10-23

## 2023-10-24 RX ORDER — NORETHINDRONE ACETATE AND ETHINYL ESTRADIOL 1.5-30(21)
1 KIT ORAL DAILY
Qty: 84 TABLET | Refills: 2 | Status: SHIPPED | OUTPATIENT
Start: 2023-10-24 | End: 2024-07-02

## 2023-12-16 DIAGNOSIS — H10.31 ACUTE CONJUNCTIVITIS OF RIGHT EYE, UNSPECIFIED ACUTE CONJUNCTIVITIS TYPE: Primary | ICD-10-CM

## 2023-12-16 DIAGNOSIS — H10.31 ACUTE CONJUNCTIVITIS OF RIGHT EYE, UNSPECIFIED ACUTE CONJUNCTIVITIS TYPE: ICD-10-CM

## 2023-12-16 RX ORDER — CIPROFLOXACIN HYDROCHLORIDE 3.5 MG/ML
SOLUTION/ DROPS TOPICAL
Qty: 5 ML | Refills: 0 | Status: SHIPPED | OUTPATIENT
Start: 2023-12-16

## 2024-01-29 DIAGNOSIS — J06.9 UPPER RESPIRATORY TRACT INFECTION, UNSPECIFIED TYPE: Primary | ICD-10-CM

## 2024-01-29 RX ORDER — AZITHROMYCIN 250 MG/1
TABLET, FILM COATED ORAL
Qty: 6 TABLET | Refills: 0 | Status: SHIPPED | OUTPATIENT
Start: 2024-01-29 | End: 2024-02-02

## 2024-02-03 DIAGNOSIS — F32.0 CURRENT MILD EPISODE OF MAJOR DEPRESSIVE DISORDER WITHOUT PRIOR EPISODE (HCC): ICD-10-CM

## 2024-02-03 DIAGNOSIS — F41.1 GAD (GENERALIZED ANXIETY DISORDER): ICD-10-CM

## 2024-02-03 RX ORDER — FLUOXETINE 20 MG/1
20 TABLET, FILM COATED ORAL DAILY
Qty: 90 TABLET | Refills: 0 | Status: SHIPPED | OUTPATIENT
Start: 2024-02-03

## 2024-02-06 ENCOUNTER — TELEPHONE (OUTPATIENT)
Dept: OBGYN CLINIC | Facility: MEDICAL CENTER | Age: 24
End: 2024-02-06

## 2024-02-06 ENCOUNTER — TELEPHONE (OUTPATIENT)
Age: 24
End: 2024-02-06

## 2024-02-06 DIAGNOSIS — Z30.41 ENCOUNTER FOR SURVEILLANCE OF CONTRACEPTIVE PILLS: Primary | ICD-10-CM

## 2024-02-06 NOTE — TELEPHONE ENCOUNTER
Patient requesting 1 pack refill on OCP until yearly exam with Dr. Feliz later this month. Med pended and sent to Dr. Feliz for approval.

## 2024-02-06 NOTE — TELEPHONE ENCOUNTER
Patient scheduled yearly for when she is back from school. Patient needs a refill of her medication and also called to see if it could be refilled. She would like it to be sent to the [facility]/pharmacy #[id number] - [address] because she is at school. Primary phone okay.

## 2024-02-07 RX ORDER — NORETHINDRONE ACETATE AND ETHINYL ESTRADIOL 1.5-30(21)
1 KIT ORAL DAILY
Qty: 28 TABLET | Refills: 0 | Status: SHIPPED | OUTPATIENT
Start: 2024-02-07

## 2024-02-21 DIAGNOSIS — H10.31 ACUTE CONJUNCTIVITIS OF RIGHT EYE, UNSPECIFIED ACUTE CONJUNCTIVITIS TYPE: ICD-10-CM

## 2024-02-21 RX ORDER — CIPROFLOXACIN HYDROCHLORIDE 3.5 MG/ML
SOLUTION/ DROPS TOPICAL
Qty: 5 ML | Refills: 0 | Status: SHIPPED | OUTPATIENT
Start: 2024-02-21

## 2024-03-11 DIAGNOSIS — Z30.41 ENCOUNTER FOR SURVEILLANCE OF CONTRACEPTIVE PILLS: ICD-10-CM

## 2024-03-12 RX ORDER — NORETHINDRONE ACETATE AND ETHINYL ESTRADIOL 1.5-30(21)
1 KIT ORAL DAILY
Qty: 28 TABLET | Refills: 0 | Status: SHIPPED | OUTPATIENT
Start: 2024-03-12

## 2024-03-25 ENCOUNTER — ANNUAL EXAM (OUTPATIENT)
Age: 24
End: 2024-03-25
Payer: COMMERCIAL

## 2024-03-25 ENCOUNTER — OFFICE VISIT (OUTPATIENT)
Dept: FAMILY MEDICINE CLINIC | Facility: HOSPITAL | Age: 24
End: 2024-03-25
Payer: COMMERCIAL

## 2024-03-25 VITALS
HEIGHT: 65 IN | TEMPERATURE: 98.3 F | HEART RATE: 86 BPM | WEIGHT: 136.4 LBS | BODY MASS INDEX: 22.73 KG/M2 | DIASTOLIC BLOOD PRESSURE: 74 MMHG | SYSTOLIC BLOOD PRESSURE: 116 MMHG

## 2024-03-25 VITALS
SYSTOLIC BLOOD PRESSURE: 118 MMHG | HEIGHT: 65 IN | DIASTOLIC BLOOD PRESSURE: 62 MMHG | WEIGHT: 136.6 LBS | BODY MASS INDEX: 22.76 KG/M2

## 2024-03-25 DIAGNOSIS — Z30.41 ENCOUNTER FOR SURVEILLANCE OF CONTRACEPTIVE PILLS: ICD-10-CM

## 2024-03-25 DIAGNOSIS — F41.1 GAD (GENERALIZED ANXIETY DISORDER): ICD-10-CM

## 2024-03-25 DIAGNOSIS — Z00.00 ANNUAL PHYSICAL EXAM: Primary | ICD-10-CM

## 2024-03-25 DIAGNOSIS — Z11.3 SCREENING FOR STD (SEXUALLY TRANSMITTED DISEASE): ICD-10-CM

## 2024-03-25 DIAGNOSIS — F32.0 CURRENT MILD EPISODE OF MAJOR DEPRESSIVE DISORDER WITHOUT PRIOR EPISODE (HCC): ICD-10-CM

## 2024-03-25 DIAGNOSIS — Z01.419 ENCOUNTER FOR ANNUAL ROUTINE GYNECOLOGICAL EXAMINATION: Primary | ICD-10-CM

## 2024-03-25 PROCEDURE — 87624 HPV HI-RISK TYP POOLED RSLT: CPT | Performed by: OBSTETRICS & GYNECOLOGY

## 2024-03-25 PROCEDURE — 87491 CHLMYD TRACH DNA AMP PROBE: CPT | Performed by: OBSTETRICS & GYNECOLOGY

## 2024-03-25 PROCEDURE — G0145 SCR C/V CYTO,THINLAYER,RESCR: HCPCS | Performed by: STUDENT IN AN ORGANIZED HEALTH CARE EDUCATION/TRAINING PROGRAM

## 2024-03-25 PROCEDURE — 99395 PREV VISIT EST AGE 18-39: CPT | Performed by: NURSE PRACTITIONER

## 2024-03-25 PROCEDURE — 99395 PREV VISIT EST AGE 18-39: CPT | Performed by: OBSTETRICS & GYNECOLOGY

## 2024-03-25 PROCEDURE — G0124 SCREEN C/V THIN LAYER BY MD: HCPCS | Performed by: STUDENT IN AN ORGANIZED HEALTH CARE EDUCATION/TRAINING PROGRAM

## 2024-03-25 PROCEDURE — 87591 N.GONORRHOEAE DNA AMP PROB: CPT | Performed by: OBSTETRICS & GYNECOLOGY

## 2024-03-25 RX ORDER — FLUOXETINE 10 MG/1
20 TABLET, FILM COATED ORAL DAILY
Qty: 90 TABLET | Refills: 1 | Status: SHIPPED | OUTPATIENT
Start: 2024-03-25

## 2024-03-25 RX ORDER — NORETHINDRONE ACETATE AND ETHINYL ESTRADIOL 1.5-30(21)
1 KIT ORAL DAILY
Qty: 84 TABLET | Refills: 3 | Status: SHIPPED | OUTPATIENT
Start: 2024-03-25

## 2024-03-25 NOTE — ASSESSMENT & PLAN NOTE
PHQ-9=0  Mood is very well controlled.   She would like to decrease dose of prozac.   Decrease to 10 mg.   I will have her send me a message with update in mood in 4-6 weeks.   F/U in 6 months.

## 2024-03-25 NOTE — PROGRESS NOTES
ADULT ANNUAL PHYSICAL  [facility] [address]     NAME: Morena Márquez  AGE: 23 y.o. SEX: female  : 2000     DATE: 3/25/2024     Assessment and Plan:     Problem List Items Addressed This Visit          Behavioral Health    ABNER (generalized anxiety disorder)     ABNER-7=2  Requesting decrease in Prozac dose.   F/U in 6 months.          Relevant Medications    FLUoxetine (PROzac) 10 MG tablet    Current mild episode of major depressive disorder without prior episode (HCC)     PHQ-9=0  Mood is very well controlled.   She would like to decrease dose of prozac.   Decrease to 10 mg.   I will have her send me a message with update in mood in 4-6 weeks.   F/U in 6 months.          Relevant Medications    FLUoxetine (PROzac) 10 MG tablet     Other Visit Diagnoses       Annual physical exam    -  Primary              Immunizations and preventive care screenings were discussed with patient today. Appropriate education was printed on patient's after visit summary.    Counseling:  Alcohol/drug use: discussed moderation in alcohol intake, the recommendations for healthy alcohol use, and avoidance of illicit drug use.  Sexual health: discussed sexually transmitted diseases, partner selection, use of condoms, avoidance of unintended pregnancy, and contraceptive alternatives.  Exercise: the importance of regular exercise/physical activity was discussed. Recommend exercise 3-5 times per week for at least 30 minutes.       Depression Screening and Follow-up Plan: Patient was screened for depression during today's encounter. They screened negative with a PHQ-9 score of 0.        Return in about 6 months (around 2024) for Next scheduled follow up.     Chief Complaint:     Chief Complaint   Patient presents with    Physical Exam      History of Present Illness:     Adult Annual Physical   Patient here for a comprehensive physical exam. The patient reports no  problems.    Prozac- tolerating well, mood has been good, would like to lower the dose.     Diet and Physical Activity  Diet/Nutrition: well balanced diet.   Exercise: strength training exercises.      Depression Screening  PHQ-2/9 Depression Screening    Little interest or pleasure in doing things: 0 - not at all  Feeling down, depressed, or hopeless: 0 - not at all  Trouble falling or staying asleep, or sleeping too much: 0 - not at all  Feeling tired or having little energy: 0 - not at all  Poor appetite or overeatin - not at all  Feeling bad about yourself - or that you are a failure or have let yourself or your family down: 0 - not at all  Trouble concentrating on things, such as reading the newspaper or watching television: 0 - not at all  Moving or speaking so slowly that other people could have noticed. Or the opposite - being so fidgety or restless that you have been moving around a lot more than usual: 0 - not at all  Thoughts that you would be better off dead, or of hurting yourself in some way: 0 - not at all  PHQ-9 Score: 0  PHQ-9 Interpretation: No or Minimal depression       General Health  Sleep: gets 7-8 hours of sleep on average.   Hearing: normal - bilateral.  Vision: goes for regular eye exams and wears glasses.   Dental: regular dental visits.       /GYN Health  Follows with gynecology? yes   Last menstrual period: 3/12/2024  Contraceptive method: birthcontrol and barrier methods.  History of STDs?: no.     Advanced Care Planning  Do you have an advanced directive? no  Do you have a durable medical power of ? no  ACP document given to the patient? no      Review of Systems:     Review of Systems   Constitutional: Negative.  Negative for fatigue.   HENT: Negative.     Eyes: Negative.    Respiratory: Negative.     Cardiovascular: Negative.    Gastrointestinal: Negative.  Negative for diarrhea, nausea and vomiting.   Endocrine: Negative.    Genitourinary: Negative.    Musculoskeletal:  Negative.    Skin: Negative.    Neurological: Negative.    Hematological: Negative.    Psychiatric/Behavioral:  Negative for agitation, decreased concentration, dysphoric mood, self-injury, sleep disturbance and suicidal ideas. The patient is nervous/anxious.       Past Medical History:     Past Medical History:   Diagnosis Date    Closed dislocation of left patella 06/20/2019    [facility] 10/19 No surgery recommended. PT, return in 8 weeks    Migraine       Past Surgical History:     No past surgical history on file.   Social History:     Social History     Socioeconomic History    Marital status: Single     Spouse name: None    Number of children: None    Years of education: None    Highest education level: None   Occupational History    Occupation: student   Tobacco Use    Smoking status: Never    Smokeless tobacco: Never   Vaping Use    Vaping status: Never Used   Substance and Sexual Activity    Alcohol use: Never    Drug use: Never    Sexual activity: Yes     Partners: Male     Birth control/protection: Condom Male, OCP   Other Topics Concern    None   Social History Narrative    None     Social Determinants of Health     Financial Resource Strain: Not on file   Food Insecurity: Not on file   Transportation Needs: Not on file   Physical Activity: Not on file   Stress: Not on file   Social Connections: Not on file   Intimate Partner Violence: Not on file   Housing Stability: Not on file      Family History:     Family History   Problem Relation Age of Onset    Migraines Mother     Hyperlipidemia Father     Seizures Father     Atrial fibrillation Father     No Known Problems Brother     Asthma Maternal Grandmother     Anxiety disorder Maternal Grandmother     Asthma Maternal Grandfather     Clotting disorder Maternal Grandfather     Heart disease Maternal Grandfather     Leukemia Maternal Grandfather     Osteoporosis Maternal Grandfather     Depression Paternal Grandmother     Hypertension Paternal  "Grandfather     Thyroid disease Paternal Grandfather     Depression Other     Diabetes Other     Vesicoureteral reflux Other     Colon polyps Neg Hx     Colon cancer Neg Hx       Current Medications:     Current Outpatient Medications   Medication Sig Dispense Refill    FLUoxetine (PROzac) 10 MG tablet Take 2 tablets (20 mg total) by mouth daily 90 tablet 1    norethindrone-ethinyl estradiol-iron (Dontrell Fe 1.5/30) 1.5-30 MG-MCG tablet Take 1 tablet by mouth daily 84 tablet 3     No current facility-administered medications for this visit.      Allergies:     No Known Allergies   Physical Exam:     /74   Pulse 86   Temp 98.3 °F (36.8 °C)   Ht 5' 5.25\" (1.657 m)   Wt 61.9 kg (136 lb 6.4 oz)   BMI 22.52 kg/m²     Physical Exam  Constitutional:       Appearance: Normal appearance. She is normal weight.   HENT:      Head: Normocephalic and atraumatic.      Right Ear: Tympanic membrane, ear canal and external ear normal.      Left Ear: Tympanic membrane, ear canal and external ear normal.      Mouth/Throat:      Mouth: Mucous membranes are moist.      Pharynx: Oropharynx is clear.   Eyes:      Extraocular Movements: Extraocular movements intact.      Conjunctiva/sclera: Conjunctivae normal.      Pupils: Pupils are equal, round, and reactive to light.   Cardiovascular:      Rate and Rhythm: Normal rate and regular rhythm.      Heart sounds: Normal heart sounds. No murmur heard.  Pulmonary:      Effort: Pulmonary effort is normal.      Breath sounds: Normal breath sounds.   Abdominal:      General: Abdomen is flat. Bowel sounds are normal.      Palpations: Abdomen is soft.   Musculoskeletal:         General: Normal range of motion.      Cervical back: Normal range of motion and neck supple.   Skin:     General: Skin is warm and dry.   Neurological:      General: No focal deficit present.      Mental Status: She is alert and oriented to person, place, and time.   Psychiatric:         Mood and Affect: Mood normal.  "        Behavior: Behavior normal.         Thought Content: Thought content normal.         Judgment: Judgment normal.          CHULA Roa   [facility] [address]

## 2024-03-25 NOTE — PROGRESS NOTES
Assessment/Plan:    1. Encounter for annual routine gynecological examination    - Liquid-based pap, screening    2. Screening for STD (sexually transmitted disease)    - Chlamydia/GC amplified DNA by PCR    3. Encounter for surveillance of contraceptive pills    - norethindrone-ethinyl estradiol-iron (Dontrell Fe 1.5/30) 1.5-30 MG-MCG tablet; Take 1 tablet by mouth daily  Dispense: 84 tablet; Refill: 3      Subjective      Morena Márquez is a 23 y.o. female who presents for annual exam. Periods are regular.  She denies any breast or urinary concerns.  She noted PCB x 1.  She accepts STD screening.    Current contraception: OCP (estrogen/progesterone)  History of abnormal Pap smear: no  Regular self breast exam: yes  History of abnormal mammogram: no  History of abnormal lipids: no    Menstrual History:  Nulligravida  Patient's last menstrual period was 03/12/2024 (exact date).  Period Cycle (Days): 28  Period Duration (Days): 4  Period Pattern: Regular  Menstrual Flow: Light  Menstrual Control: Tampon  Dysmenorrhea: None    Past Medical History:   Diagnosis Date    Closed dislocation of left patella 06/20/2019    [facility] 10/19 No surgery recommended. PT, return in 8 weeks    Migraine        Family History   Problem Relation Age of Onset    Migraines Mother     Hyperlipidemia Father     Seizures Father     Atrial fibrillation Father     No Known Problems Brother     Asthma Maternal Grandmother     Anxiety disorder Maternal Grandmother     Asthma Maternal Grandfather     Clotting disorder Maternal Grandfather     Heart disease Maternal Grandfather     Leukemia Maternal Grandfather     Osteoporosis Maternal Grandfather     Depression Paternal Grandmother     Hypertension Paternal Grandfather     Thyroid disease Paternal Grandfather     Depression Other     Diabetes Other     Vesicoureteral reflux Other     Colon polyps Neg Hx     Colon cancer Neg Hx        The following portions of the patient's history were  "reviewed and updated as appropriate: allergies, current medications, past family history, past medical history, past social history, past surgical history, and problem list.    Review of Systems  Pertinent items are noted in HPI.      Objective      /62 (BP Location: Right arm, Patient Position: Sitting, Cuff Size: Large)   Ht 5' 5.25\" (1.657 m)   Wt 62 kg (136 lb 9.6 oz)   LMP 03/12/2024 (Exact Date)   BMI 22.56 kg/m²     General:   alert and oriented, in no acute distress   Heart:  Breasts: regular rate and rhythm  appear normal, no suspicious masses, no skin or nipple changes or axillary nodes.   Lungs: Effort normal   Abdomen: soft, non-tender, without masses or organomegaly   Vulva: normal   Vagina: normal mucosa   Cervix: no lesions; small ectropion noted   Uterus: normal size, mobile, non-tender   Adnexa: normal adnexa and no mass, fullness, tenderness               "

## 2024-03-27 LAB
C TRACH DNA SPEC QL NAA+PROBE: NEGATIVE
N GONORRHOEA DNA SPEC QL NAA+PROBE: NEGATIVE

## 2024-04-01 LAB
HPV HR 12 DNA CVX QL NAA+PROBE: NEGATIVE
HPV16 DNA CVX QL NAA+PROBE: NEGATIVE
HPV18 DNA CVX QL NAA+PROBE: NEGATIVE

## 2024-04-05 DIAGNOSIS — Z30.41 ENCOUNTER FOR SURVEILLANCE OF CONTRACEPTIVE PILLS: ICD-10-CM

## 2024-04-05 LAB
LAB AP GYN PRIMARY INTERPRETATION: ABNORMAL
LAB AP LMP: ABNORMAL
Lab: ABNORMAL
PATH INTERP SPEC-IMP: ABNORMAL

## 2024-04-08 RX ORDER — NORETHINDRONE ACETATE AND ETHINYL ESTRADIOL 1.5-30(21)
1 KIT ORAL DAILY
Qty: 84 TABLET | Refills: 1 | Status: SHIPPED | OUTPATIENT
Start: 2024-04-08

## 2024-05-07 DIAGNOSIS — L08.9 SKIN INFECTION: Primary | ICD-10-CM

## 2024-05-07 RX ORDER — CEPHALEXIN 500 MG/1
500 CAPSULE ORAL EVERY 6 HOURS SCHEDULED
Qty: 28 CAPSULE | Refills: 0 | Status: SHIPPED | OUTPATIENT
Start: 2024-05-07 | End: 2024-05-14

## 2024-06-28 DIAGNOSIS — Z30.41 ENCOUNTER FOR SURVEILLANCE OF CONTRACEPTIVE PILLS: ICD-10-CM

## 2024-06-28 RX ORDER — NORETHINDRONE ACETATE AND ETHINYL ESTRADIOL 1.5-30(21)
1 KIT ORAL DAILY
Qty: 84 TABLET | Refills: 1 | Status: SHIPPED | OUTPATIENT
Start: 2024-06-28

## 2024-07-22 DIAGNOSIS — J01.90 ACUTE NON-RECURRENT SINUSITIS, UNSPECIFIED LOCATION: Primary | ICD-10-CM

## 2024-07-22 RX ORDER — AZITHROMYCIN 250 MG/1
TABLET, FILM COATED ORAL
Qty: 6 TABLET | Refills: 0 | Status: SHIPPED | OUTPATIENT
Start: 2024-07-22 | End: 2024-07-26

## 2024-07-30 DIAGNOSIS — F41.1 GAD (GENERALIZED ANXIETY DISORDER): ICD-10-CM

## 2024-07-30 DIAGNOSIS — F32.0 CURRENT MILD EPISODE OF MAJOR DEPRESSIVE DISORDER WITHOUT PRIOR EPISODE (HCC): ICD-10-CM

## 2024-07-30 RX ORDER — FLUOXETINE 10 MG/1
20 TABLET, FILM COATED ORAL DAILY
Qty: 180 TABLET | Refills: 1 | Status: SHIPPED | OUTPATIENT
Start: 2024-07-30

## 2024-08-12 ENCOUNTER — OFFICE VISIT (OUTPATIENT)
Dept: FAMILY MEDICINE CLINIC | Facility: HOSPITAL | Age: 24
End: 2024-08-12
Payer: COMMERCIAL

## 2024-08-12 VITALS
SYSTOLIC BLOOD PRESSURE: 115 MMHG | DIASTOLIC BLOOD PRESSURE: 80 MMHG | BODY MASS INDEX: 24.03 KG/M2 | HEIGHT: 65 IN | HEART RATE: 88 BPM | WEIGHT: 144.2 LBS | TEMPERATURE: 98.6 F | OXYGEN SATURATION: 98 %

## 2024-08-12 DIAGNOSIS — G43.711 INTRACTABLE CHRONIC MIGRAINE WITHOUT AURA AND WITH STATUS MIGRAINOSUS: Primary | ICD-10-CM

## 2024-08-12 PROCEDURE — 99214 OFFICE O/P EST MOD 30 MIN: CPT | Performed by: INTERNAL MEDICINE

## 2024-08-12 RX ORDER — PREDNISONE 10 MG/1
TABLET ORAL
Qty: 39 TABLET | Refills: 0 | Status: SHIPPED | OUTPATIENT
Start: 2024-08-12

## 2024-08-12 NOTE — PROGRESS NOTES
"Ambulatory Visit  Name: Morena Márquez      : 2000      MRN: [id number]  Encounter Provider: Bharti Orr DO  Encounter Date: 2024   Encounter department: [facility] PRIMARY CARE [address]     Assessment & Plan   1. Intractable chronic migraine without aura and with status migrainosus  Comments:  First will break migraine with steroid taper - SE reviewed - urged not to take on any empty stomach, will check MRI d/t new onset w/o previous eval, common triggers reviewed and urged to watch for triggers, keep hydrated and ensure do not skip meals, will need further eval if HA's persists - T/C daily preventative medication if > 6-8 migraines a month, red flag symptoms reviewed - to ED if they occur  Orders:  -     MRI brain wo contrast; Future; Expected date: 2024  -     predniSONE 10 mg tablet; 3 tab PO bid x 3 days then 2 tab PO bid x 3 days then 1 tab PO bid x 3 days then 1 tab PO q day x 3 days then STOP       History of Present Illness     HPI Pt here for an acute visit    Pt with history of intermittent migraines for the last year or so -never seen doc specifically for this.  She notes the past 2 wks she has had a migraine every day - difficult to tell me if it has been a persistent migraine for 2 wks of just has a new one every day.  She notes the head pain is \"sharp and stabbing\".  She notes currently the pain is B/L temple region but sometime is behind both eyes - moves around.  She notes some light sensitivity but no sound sensitivity with the head pain. She has some nausea but no vomiting. She denies preceding aura. She has some dizziness but notes no loss of vision/double vision/slurred speech/word finding difficulty/focal weakness or numbness or tingling.  She usually uses Ibuprofen or Excedrin migraine but notes they don't help.  She cannot id any specific triggers. She feels she eats regularly and feels she keeps hydrated.  She notes early on migraines were around " "menses but now she notes they are almost constant and not always associated with menses. She cannot id any trigger with stress/lighting/environmental triggers.      Review of Systems   Constitutional:  Negative for chills, fever and unexpected weight change.   HENT:  Negative for congestion, hearing loss, tinnitus and trouble swallowing.    Eyes:  Positive for photophobia. Negative for pain and visual disturbance.   Respiratory:  Negative for cough and shortness of breath.    Cardiovascular:  Negative for chest pain and palpitations.   Gastrointestinal:  Positive for nausea. Negative for abdominal pain, constipation, diarrhea and vomiting.   Genitourinary:  Negative for difficulty urinating and dysuria.   Musculoskeletal:  Negative for back pain and neck pain.   Skin:  Negative for rash and wound.   Neurological:  Positive for dizziness and headaches. Negative for speech difficulty, weakness and numbness.   Hematological:  Does not bruise/bleed easily.   Psychiatric/Behavioral:  Negative for confusion.        Objective     /80   Pulse 88   Temp 98.6 °F (37 °C)   Ht 5' 5.25\" (1.657 m)   Wt 65.4 kg (144 lb 3.2 oz)   SpO2 98%   BMI 23.81 kg/m²     Physical Exam  Vitals and nursing note reviewed.   Constitutional:       General: She is not in acute distress.     Appearance: She is well-developed. She is not ill-appearing.   HENT:      Head: Normocephalic and atraumatic.      Right Ear: Tympanic membrane and external ear normal. There is no impacted cerumen.      Left Ear: Tympanic membrane and external ear normal. There is no impacted cerumen.      Mouth/Throat:      Mouth: Mucous membranes are moist.      Pharynx: Oropharynx is clear. No oropharyngeal exudate.   Eyes:      General:         Right eye: No discharge.         Left eye: No discharge.      Extraocular Movements: Extraocular movements intact.      Conjunctiva/sclera: Conjunctivae normal.      Pupils: Pupils are equal, round, and reactive to light. "   Neck:      Thyroid: No thyromegaly.      Vascular: No carotid bruit.      Trachea: No tracheal deviation.   Cardiovascular:      Rate and Rhythm: Normal rate and regular rhythm.      Heart sounds: Normal heart sounds. No murmur heard.  Pulmonary:      Effort: Pulmonary effort is normal. No respiratory distress.      Breath sounds: Normal breath sounds. No wheezing, rhonchi or rales.   Musculoskeletal:         General: No deformity or signs of injury.      Cervical back: Neck supple.   Lymphadenopathy:      Cervical: No cervical adenopathy.   Skin:     General: Skin is warm and dry.      Coloration: Skin is not pale.      Findings: No bruising or rash.   Neurological:      General: No focal deficit present.      Mental Status: She is alert.      Cranial Nerves: No cranial nerve deficit.      Sensory: No sensory deficit.      Motor: No weakness or abnormal muscle tone.      Coordination: Coordination normal.      Gait: Gait normal.      Deep Tendon Reflexes: Reflexes normal.      Comments: CN II-XII intact, sensation intact to light touch globally, 5/5 global muscle strength, 2/4 patellar DTR B/L LE, nml FN and HS, neg Rhomberg, nml gait w/o assistance   Psychiatric:         Mood and Affect: Mood normal.         Behavior: Behavior normal.         Thought Content: Thought content normal.         Judgment: Judgment normal.       Administrative Statements

## 2024-08-13 ENCOUNTER — TELEPHONE (OUTPATIENT)
Age: 24
End: 2024-08-13

## 2024-08-13 NOTE — TELEPHONE ENCOUNTER
Patient is calling to request that her MRI script be faxed over to [facility] in [facility] where she is in school  Fax number [phone]  Please review  Thank you

## 2024-08-14 ENCOUNTER — APPOINTMENT (EMERGENCY)
Dept: CT IMAGING | Facility: HOSPITAL | Age: 24
End: 2024-08-14
Payer: COMMERCIAL

## 2024-08-14 ENCOUNTER — HOSPITAL ENCOUNTER (EMERGENCY)
Facility: HOSPITAL | Age: 24
Discharge: HOME | End: 2024-08-14
Attending: EMERGENCY MEDICINE
Payer: COMMERCIAL

## 2024-08-14 VITALS
SYSTOLIC BLOOD PRESSURE: 117 MMHG | TEMPERATURE: 98 F | RESPIRATION RATE: 18 BRPM | DIASTOLIC BLOOD PRESSURE: 70 MMHG | HEART RATE: 65 BPM | OXYGEN SATURATION: 100 %

## 2024-08-14 DIAGNOSIS — G43.909 MIGRAINE HEADACHE: Primary | ICD-10-CM

## 2024-08-14 DIAGNOSIS — R51.9 INTRACTABLE HEADACHE, UNSPECIFIED CHRONICITY PATTERN, UNSPECIFIED HEADACHE TYPE: Primary | ICD-10-CM

## 2024-08-14 LAB
ALBUMIN SERPL BCG-MCNC: 4.6 G/DL (ref 3.5–5)
ALP SERPL-CCNC: 43 U/L (ref 34–104)
ALT SERPL W P-5'-P-CCNC: 12 U/L (ref 7–52)
ANION GAP SERPL CALCULATED.3IONS-SCNC: 8 MMOL/L (ref 4–13)
AST SERPL W P-5'-P-CCNC: 17 U/L (ref 13–39)
BASOPHILS # BLD AUTO: 0.02 THOUSANDS/ÂΜL (ref 0–0.1)
BASOPHILS NFR BLD AUTO: 0 % (ref 0–1)
BILIRUB SERPL-MCNC: 0.33 MG/DL (ref 0.2–1)
BUN SERPL-MCNC: 12 MG/DL (ref 5–25)
CALCIUM SERPL-MCNC: 9.5 MG/DL (ref 8.4–10.2)
CHLORIDE SERPL-SCNC: 105 MMOL/L (ref 96–108)
CO2 SERPL-SCNC: 23 MMOL/L (ref 21–32)
CREAT SERPL-MCNC: 0.67 MG/DL (ref 0.6–1.3)
EOSINOPHIL # BLD AUTO: 0 THOUSAND/ÂΜL (ref 0–0.61)
EOSINOPHIL NFR BLD AUTO: 0 % (ref 0–6)
ERYTHROCYTE [DISTWIDTH] IN BLOOD BY AUTOMATED COUNT: 12 % (ref 11.6–15.1)
FLUAV RNA RESP QL NAA+PROBE: NEGATIVE
FLUBV RNA RESP QL NAA+PROBE: NEGATIVE
GFR SERPL CREATININE-BSD FRML MDRD: 126 ML/MIN/1.73SQ M
GLUCOSE SERPL-MCNC: 146 MG/DL (ref 65–140)
HCG SERPL QL: NEGATIVE
HCT VFR BLD AUTO: 42.4 % (ref 34.8–46.1)
HGB BLD-MCNC: 14.4 G/DL (ref 11.5–15.4)
IMM GRANULOCYTES # BLD AUTO: 0.14 THOUSAND/UL (ref 0–0.2)
IMM GRANULOCYTES NFR BLD AUTO: 1 % (ref 0–2)
LYMPHOCYTES # BLD AUTO: 1.36 THOUSANDS/ÂΜL (ref 0.6–4.47)
LYMPHOCYTES NFR BLD AUTO: 11 % (ref 14–44)
MCH RBC QN AUTO: 30.1 PG (ref 26.8–34.3)
MCHC RBC AUTO-ENTMCNC: 34 G/DL (ref 31.4–37.4)
MCV RBC AUTO: 89 FL (ref 82–98)
MONOCYTES # BLD AUTO: 0.47 THOUSAND/ÂΜL (ref 0.17–1.22)
MONOCYTES NFR BLD AUTO: 4 % (ref 4–12)
NEUTROPHILS # BLD AUTO: 10.59 THOUSANDS/ÂΜL (ref 1.85–7.62)
NEUTS SEG NFR BLD AUTO: 84 % (ref 43–75)
NRBC BLD AUTO-RTO: 0 /100 WBCS
PLATELET # BLD AUTO: 362 THOUSANDS/UL (ref 149–390)
PMV BLD AUTO: 9.9 FL (ref 8.9–12.7)
POTASSIUM SERPL-SCNC: 4.2 MMOL/L (ref 3.5–5.3)
PROT SERPL-MCNC: 8 G/DL (ref 6.4–8.4)
RBC # BLD AUTO: 4.79 MILLION/UL (ref 3.81–5.12)
RSV RNA RESP QL NAA+PROBE: NEGATIVE
SARS-COV-2 RNA RESP QL NAA+PROBE: NEGATIVE
SODIUM SERPL-SCNC: 136 MMOL/L (ref 135–147)
WBC # BLD AUTO: 12.58 THOUSAND/UL (ref 4.31–10.16)

## 2024-08-14 PROCEDURE — 80053 COMPREHEN METABOLIC PANEL: CPT | Performed by: PHYSICIAN ASSISTANT

## 2024-08-14 PROCEDURE — 0241U HB NFCT DS VIR RESP RNA 4 TRGT: CPT | Performed by: PHYSICIAN ASSISTANT

## 2024-08-14 PROCEDURE — 85025 COMPLETE CBC W/AUTO DIFF WBC: CPT | Performed by: PHYSICIAN ASSISTANT

## 2024-08-14 PROCEDURE — 36415 COLL VENOUS BLD VENIPUNCTURE: CPT | Performed by: PHYSICIAN ASSISTANT

## 2024-08-14 PROCEDURE — 96365 THER/PROPH/DIAG IV INF INIT: CPT

## 2024-08-14 PROCEDURE — 99284 EMERGENCY DEPT VISIT MOD MDM: CPT | Performed by: PHYSICIAN ASSISTANT

## 2024-08-14 PROCEDURE — 84703 CHORIONIC GONADOTROPIN ASSAY: CPT | Performed by: PHYSICIAN ASSISTANT

## 2024-08-14 PROCEDURE — 96375 TX/PRO/DX INJ NEW DRUG ADDON: CPT

## 2024-08-14 PROCEDURE — 99283 EMERGENCY DEPT VISIT LOW MDM: CPT

## 2024-08-14 PROCEDURE — 70450 CT HEAD/BRAIN W/O DYE: CPT

## 2024-08-14 PROCEDURE — 96372 THER/PROPH/DIAG INJ SC/IM: CPT

## 2024-08-14 RX ORDER — KETOROLAC TROMETHAMINE 30 MG/ML
30 INJECTION, SOLUTION INTRAMUSCULAR; INTRAVENOUS ONCE
Status: COMPLETED | OUTPATIENT
Start: 2024-08-14 | End: 2024-08-14

## 2024-08-14 RX ORDER — SUMATRIPTAN 6 MG/.5ML
6 INJECTION, SOLUTION SUBCUTANEOUS ONCE
Status: COMPLETED | OUTPATIENT
Start: 2024-08-14 | End: 2024-08-14

## 2024-08-14 RX ORDER — MAGNESIUM SULFATE HEPTAHYDRATE 40 MG/ML
2 INJECTION, SOLUTION INTRAVENOUS ONCE
Status: COMPLETED | OUTPATIENT
Start: 2024-08-14 | End: 2024-08-14

## 2024-08-14 RX ORDER — DIPHENHYDRAMINE HYDROCHLORIDE 50 MG/ML
25 INJECTION INTRAMUSCULAR; INTRAVENOUS ONCE
Status: COMPLETED | OUTPATIENT
Start: 2024-08-14 | End: 2024-08-14

## 2024-08-14 RX ORDER — METOCLOPRAMIDE HYDROCHLORIDE 5 MG/ML
10 INJECTION INTRAMUSCULAR; INTRAVENOUS ONCE
Status: COMPLETED | OUTPATIENT
Start: 2024-08-14 | End: 2024-08-14

## 2024-08-14 RX ADMIN — DIPHENHYDRAMINE HYDROCHLORIDE 25 MG: 50 INJECTION, SOLUTION INTRAMUSCULAR; INTRAVENOUS at 17:09

## 2024-08-14 RX ADMIN — SUMATRIPTAN 6 MG: 6 INJECTION, SOLUTION SUBCUTANEOUS at 18:08

## 2024-08-14 RX ADMIN — KETOROLAC TROMETHAMINE 30 MG: 30 INJECTION, SOLUTION INTRAMUSCULAR; INTRAVENOUS at 17:07

## 2024-08-14 RX ADMIN — MAGNESIUM SULFATE HEPTAHYDRATE 2 G: 2 INJECTION, SOLUTION INTRAVENOUS at 17:18

## 2024-08-14 RX ADMIN — SODIUM CHLORIDE 1000 ML: 0.9 INJECTION, SOLUTION INTRAVENOUS at 17:07

## 2024-08-14 RX ADMIN — METOCLOPRAMIDE 10 MG: 5 INJECTION, SOLUTION INTRAMUSCULAR; INTRAVENOUS at 17:11

## 2024-08-14 NOTE — ED PROVIDER NOTES
History  Chief Complaint   Patient presents with    Headache     Constant migraines x2 weeks. Saw pcp on Monday. Was put on prednisone with no relief. Denies meds for pain today.      Patient is a 22 y/o F that presents to the ED with headaches x 2 weeks.  Patient states the headache waxes and wanes, but is always there.  It is currenlty 8/10 on pain scale and located in her temples.  She has nausea, no vomiting.  She has photosensitivity. NO fevers/chills, or neck pain or stiffness.  She was in [place] 2 weeks ago and had nasal congestion and sinus pressure, but that resolved.  She does get headaches occasionally and they are mostly related to when she has her menses.  She has been taking OTC meds and her PCP started her on a prednisone taper 2 days ago, but it doesn't seem to be helping.       History provided by:  Patient  Headache  Associated symptoms: congestion, nausea and photophobia    Associated symptoms: no abdominal pain, no back pain, no cough, no diarrhea, no dizziness, no fever, no neck pain, no numbness, no vomiting and no weakness        Prior to Admission Medications   Prescriptions Last Dose Informant Patient Reported? Taking?   FLUoxetine 10 MG tablet   No No   Sig: Take 2 tablets (20 mg total) by mouth daily   norethindrone-ethinyl estradiol-iron (Dontrell Fe 1.5/30) 1.5-30 MG-MCG tablet   No No   Sig: Take 1 tablet by mouth daily   predniSONE 10 mg tablet   No No   Sig: 3 tab PO bid x 3 days then 2 tab PO bid x 3 days then 1 tab PO bid x 3 days then 1 tab PO q day x 3 days then STOP      Facility-Administered Medications: None       Past Medical History:   Diagnosis Date    Closed dislocation of left patella 06/20/2019    [facility] 10/19 No surgery recommended. PT, return in 8 weeks    Migraine        History reviewed. No pertinent surgical history.    Family History   Problem Relation Age of Onset    Migraines Mother     Hyperlipidemia Father     Seizures Father     Atrial fibrillation Father      No Known Problems Brother     Asthma Maternal Grandmother     Anxiety disorder Maternal Grandmother     Asthma Maternal Grandfather     Clotting disorder Maternal Grandfather     Heart disease Maternal Grandfather     Leukemia Maternal Grandfather     Osteoporosis Maternal Grandfather     Depression Paternal Grandmother     Hypertension Paternal Grandfather     Thyroid disease Paternal Grandfather     Depression Other     Diabetes Other     Vesicoureteral reflux Other     Colon polyps Neg Hx     Colon cancer Neg Hx      I have reviewed and agree with the history as documented.    E-Cigarette/Vaping    E-Cigarette Use Never User      E-Cigarette/Vaping Substances    Nicotine No     THC No     CBD No     Flavoring No     Other No     Unknown No      Social History     Tobacco Use    Smoking status: Never    Smokeless tobacco: Never   Vaping Use    Vaping status: Never Used   Substance Use Topics    Alcohol use: Yes     Comment: rare    Drug use: Never       Review of Systems   Constitutional:  Negative for chills and fever.   HENT:  Positive for congestion.    Eyes:  Positive for photophobia.   Respiratory:  Negative for cough and shortness of breath.    Cardiovascular:  Negative for chest pain, palpitations and leg swelling.   Gastrointestinal:  Positive for nausea. Negative for abdominal pain, diarrhea and vomiting.   Genitourinary:  Negative for dysuria.   Musculoskeletal:  Negative for back pain and neck pain.   Skin:  Negative for color change and rash.   Neurological:  Positive for headaches. Negative for dizziness, facial asymmetry, speech difficulty, weakness, light-headedness and numbness.   Psychiatric/Behavioral:  Negative for confusion.    All other systems reviewed and are negative.      Physical Exam  Physical Exam  Vitals and nursing note reviewed.   Constitutional:       General: She is not in acute distress.     Appearance: Normal appearance. She is well-developed and well-groomed. She is not  ill-appearing or diaphoretic.   HENT:      Head: Normocephalic and atraumatic.      Right Ear: Hearing normal.      Left Ear: Hearing normal.      Nose: Nose normal.      Mouth/Throat:      Mouth: Mucous membranes are moist.      Pharynx: Oropharynx is clear.   Eyes:      Extraocular Movements: Extraocular movements intact.      Conjunctiva/sclera: Conjunctivae normal.      Pupils: Pupils are equal, round, and reactive to light.   Cardiovascular:      Rate and Rhythm: Normal rate and regular rhythm.      Heart sounds: Normal heart sounds.   Pulmonary:      Effort: Pulmonary effort is normal.      Breath sounds: Normal breath sounds. No wheezing, rhonchi or rales.   Musculoskeletal:         General: Normal range of motion.      Cervical back: Normal range of motion and neck supple. No rigidity. No pain with movement.      Right lower leg: No edema.      Left lower leg: No edema.   Skin:     General: Skin is warm and dry.      Coloration: Skin is not jaundiced or pale.      Findings: No rash.   Neurological:      Mental Status: She is alert and oriented to person, place, and time.      GCS: GCS eye subscore is 4. GCS verbal subscore is 5. GCS motor subscore is 6.      Cranial Nerves: Cranial nerves 2-12 are intact.      Sensory: Sensation is intact.      Motor: Motor function is intact.      Coordination: Coordination is intact. Finger-Nose-Finger Test normal.      Gait: Gait is intact.   Psychiatric:         Mood and Affect: Mood normal.         Speech: Speech normal.         Behavior: Behavior is cooperative.         Vital Signs  ED Triage Vitals [08/14/24 1631]   Temperature Pulse Respirations Blood Pressure SpO2   98 °F (36.7 °C) 74 16 121/72 99 %      Temp Source Heart Rate Source Patient Position - Orthostatic VS BP Location FiO2 (%)   Oral Monitor Sitting Right arm --      Pain Score       8           Vitals:    08/14/24 1631 08/14/24 1815 08/14/24 1830   BP: 121/72 114/65 117/70   Pulse: 74 67 65   Patient  Position - Orthostatic VS: Sitting  Sitting         Visual Acuity  Visual Acuity      Flowsheet Row Most Recent Value   L Pupil Size (mm) 3   R Pupil Size (mm) 3            ED Medications  Medications   ketorolac (TORADOL) injection 30 mg (30 mg Intravenous Given 8/14/24 1707)   metoclopramide (REGLAN) injection 10 mg (10 mg Intravenous Given 8/14/24 1711)   diphenhydrAMINE (BENADRYL) injection 25 mg (25 mg Intravenous Given 8/14/24 1709)   SUMAtriptan (IMITREX) subcutaneous injection 6 mg (6 mg Subcutaneous Given 8/14/24 1808)   magnesium sulfate 2 g/50 mL IVPB (premix) 2 g (0 g Intravenous Stopped 8/14/24 1818)   sodium chloride 0.9 % bolus 1,000 mL (0 mL Intravenous Stopped 8/14/24 1814)       Diagnostic Studies  Results Reviewed       Procedure Component Value Units Date/Time    hCG, qualitative pregnancy [[id number]]  (Normal) Collected: 08/14/24 1707    Lab Status: Final result Specimen: Blood from Arm, Right Updated: 08/14/24 1759     Preg, Serum Negative    FLU/RSV/COVID - if FLU/RSV clinically relevant [[id number]]  (Normal) Collected: 08/14/24 1707    Lab Status: Final result Specimen: Nares from Nose Updated: 08/14/24 1754     SARS-CoV-2 Negative     INFLUENZA A PCR Negative     INFLUENZA B PCR Negative     RSV PCR Negative    Narrative:      This test has been performed using the CoV-2/Flu/RSV plus assay on the [employer] GeneXpert platform. This test has been validated by the  and verified by the performing laboratory.     This test is designed to amplify and detect the following: nucleocapsid (N), envelope (E), and RNA-dependent RNA polymerase (RdRP) genes of the SARS-CoV-2 genome; matrix (M), basic polymerase (PB2), and acidic protein (PA) segments of the influenza A genome; matrix (M) and non-structural protein (NS) segments of the influenza B genome, and the nucleocapsid genes of RSV A and RSV B.     Positive results are indicative of the presence of Flu A, Flu B, RSV, and/or SARS-CoV-2 RNA.  Positive results for SARS-CoV-2 or suspected novel influenza should be reported to state, local, or federal health departments according to local reporting requirements.      All results should be assessed in conjunction with clinical presentation and other laboratory markers for clinical management.     FOR PEDIATRIC PATIENTS - copy/paste COVID Guidelines URL to browser: [email or web address]       Comprehensive metabolic panel [[id number]]  (Abnormal) Collected: 08/14/24 1707    Lab Status: Final result Specimen: Blood from Arm, Right Updated: 08/14/24 1734     Sodium 136 mmol/L      Potassium 4.2 mmol/L      Chloride 105 mmol/L      CO2 23 mmol/L      ANION GAP 8 mmol/L      BUN 12 mg/dL      Creatinine 0.67 mg/dL      Glucose 146 mg/dL      Calcium 9.5 mg/dL      AST 17 U/L      ALT 12 U/L      Alkaline Phosphatase 43 U/L      Total Protein 8.0 g/dL      Albumin 4.6 g/dL      Total Bilirubin 0.33 mg/dL      eGFR 126 ml/min/1.73sq m     Narrative:      National Kidney Disease Foundation guidelines for Chronic Kidney Disease (CKD):     Stage 1 with normal or high GFR (GFR > 90 mL/min/1.73 square meters)    Stage 2 Mild CKD (GFR = 60-89 mL/min/1.73 square meters)    Stage 3A Moderate CKD (GFR = 45-59 mL/min/1.73 square meters)    Stage 3B Moderate CKD (GFR = 30-44 mL/min/1.73 square meters)    Stage 4 Severe CKD (GFR = 15-29 mL/min/1.73 square meters)    Stage 5 End Stage CKD (GFR <15 mL/min/1.73 square meters)  Note: GFR calculation is accurate only with a steady state creatinine    CBC and differential [[id number]]  (Abnormal) Collected: 08/14/24 1707    Lab Status: Final result Specimen: Blood from Arm, Right Updated: 08/14/24 1718     WBC 12.58 Thousand/uL      RBC 4.79 Million/uL      Hemoglobin 14.4 g/dL      Hematocrit 42.4 %      MCV 89 fL      MCH 30.1 pg      MCHC 34.0 g/dL      RDW 12.0 %      MPV 9.9 fL      Platelets 362 Thousands/uL      nRBC 0 /100  WBCs      Segmented % 84 %      Immature Grans % 1 %      Lymphocytes % 11 %      Monocytes % 4 %      Eosinophils Relative 0 %      Basophils Relative 0 %      Absolute Neutrophils 10.59 Thousands/µL      Absolute Immature Grans 0.14 Thousand/uL      Absolute Lymphocytes 1.36 Thousands/µL      Absolute Monocytes 0.47 Thousand/µL      Eosinophils Absolute 0.00 Thousand/µL      Basophils Absolute 0.02 Thousands/µL                    CT head wo contrast   Final Result by Carlos Starr MD (08/14 1859)      No acute intracranial abnormality.                  Workstation performed: [id number]                    Procedures  Procedures         ED Course  ED Course as of 08/14/24 1922   Wed Aug 14, 2024   1819 GLUCOSE(!): 146  Discussed with patient and instructed her to f/u with PCP for recheck. She did have a sugary drink prior to arrival.    1910 Patient states she is feeling much better, pain 3/10.                                   SBIRT 22yo+      Flowsheet Row Most Recent Value   Initial Alcohol Screen: US AUDIT-C     1. How often do you have a drink containing alcohol? 1 Filed at: 08/14/2024 1634   2. How many drinks containing alcohol do you have on a typical day you are drinking?  0 Filed at: 08/14/2024 1634   3b. FEMALE Any Age, or MALE 65+: How often do you have 4 or more drinks on one occassion? 0 Filed at: 08/14/2024 1634   Audit-C Score 1 Filed at: 08/14/2024 1634   PANKAJ: How many times in the past year have you...    Used an illegal drug or used a prescription medication for non-medical reasons? Never Filed at: 08/14/2024 1634                      Medical Decision Making  Patient with migraine headache, intractable, will order labs, CT scan to r/o hemorrhage/tumor.  Patient improved with migraine cocktail.  Patient has MRI scheduled as outpt.  Patient with hyperglycemia, most likely from sugary drink, advised f/u with PCP for recheck or CMP and CBC.  Return precautions given.     Amount and/or Complexity of  Data Reviewed  External Data Reviewed: labs.  Labs: ordered. Decision-making details documented in ED Course.  Radiology: ordered.    Risk  Prescription drug management.                 Disposition  Final diagnoses:   Migraine headache     Time reflects when diagnosis was documented in both MDM as applicable and the Disposition within this note       Time User Action Codes Description Comment    8/14/2024  7:10 PM Martha Almendarez [G43.909] Migraine headache     8/14/2024  7:10 PM Martha Almendarez Add [R73.9] Hyperglycemia     8/14/2024  7:11 PM Martha Almendarez Remove [R73.9] Hyperglycemia           ED Disposition       ED Disposition   Discharge    Condition   Stable    Date/Time   Wed Aug 14, 2024 1910    Comment   Morena Márquez discharge to home/self care.                   Follow-up Information       Follow up With Specialties Details Why Contact Info    [facility], Nurse Practitioner Schedule an appointment as soon as possible for a visit in 1 week For recheck of blood sugar and WBC. [address] [place] [id number] [phone]              Patient's Medications   Discharge Prescriptions    No medications on file       No discharge procedures on file.    PDMP Review       None            ED Provider  Electronically Signed by             Martha Almendarez PA-C  08/14/24 1637

## 2024-08-14 NOTE — DISCHARGE INSTRUCTIONS
Rest, increase fluids.  Keep appointment for MRI.  Follow up with PCP in 1 week for recheck of CBC and blood sugar.  Return to ER if symptoms worsen.

## 2024-08-16 DIAGNOSIS — G43.711 INTRACTABLE CHRONIC MIGRAINE WITHOUT AURA AND WITH STATUS MIGRAINOSUS: Primary | ICD-10-CM

## 2024-08-16 RX ORDER — SUMATRIPTAN 25 MG/1
25 TABLET, FILM COATED ORAL ONCE AS NEEDED
Qty: 9 TABLET | Refills: 1 | Status: SHIPPED | OUTPATIENT
Start: 2024-08-16

## 2024-09-17 DIAGNOSIS — Z30.41 ENCOUNTER FOR SURVEILLANCE OF CONTRACEPTIVE PILLS: ICD-10-CM

## 2024-09-18 RX ORDER — NORETHINDRONE ACETATE AND ETHINYL ESTRADIOL 1.5-30(21)
1 KIT ORAL DAILY
Qty: 84 TABLET | Refills: 0 | Status: SHIPPED | OUTPATIENT
Start: 2024-09-18 | End: 2024-09-24 | Stop reason: SDUPTHER

## 2024-09-24 DIAGNOSIS — Z30.41 ENCOUNTER FOR SURVEILLANCE OF CONTRACEPTIVE PILLS: ICD-10-CM

## 2024-09-24 RX ORDER — NORETHINDRONE ACETATE AND ETHINYL ESTRADIOL 1.5-30(21)
1 KIT ORAL DAILY
Qty: 84 TABLET | Refills: 0 | Status: SHIPPED | OUTPATIENT
Start: 2024-09-24

## 2024-10-17 DIAGNOSIS — G43.711 INTRACTABLE CHRONIC MIGRAINE WITHOUT AURA AND WITH STATUS MIGRAINOSUS: ICD-10-CM

## 2024-10-17 RX ORDER — SUMATRIPTAN 25 MG/1
25 TABLET, FILM COATED ORAL ONCE AS NEEDED
Qty: 9 TABLET | Refills: 1 | Status: SHIPPED | OUTPATIENT
Start: 2024-10-17

## 2024-11-25 ENCOUNTER — OFFICE VISIT (OUTPATIENT)
Dept: FAMILY MEDICINE CLINIC | Facility: HOSPITAL | Age: 24
End: 2024-11-25
Payer: COMMERCIAL

## 2024-11-25 VITALS
SYSTOLIC BLOOD PRESSURE: 108 MMHG | DIASTOLIC BLOOD PRESSURE: 66 MMHG | HEART RATE: 74 BPM | OXYGEN SATURATION: 99 % | HEIGHT: 65 IN | TEMPERATURE: 98.2 F | BODY MASS INDEX: 24.83 KG/M2 | WEIGHT: 149 LBS

## 2024-11-25 DIAGNOSIS — F32.0 CURRENT MILD EPISODE OF MAJOR DEPRESSIVE DISORDER WITHOUT PRIOR EPISODE (HCC): Primary | ICD-10-CM

## 2024-11-25 DIAGNOSIS — G43.009 MIGRAINE WITHOUT AURA AND WITHOUT STATUS MIGRAINOSUS, NOT INTRACTABLE: ICD-10-CM

## 2024-11-25 DIAGNOSIS — Z23 ENCOUNTER FOR IMMUNIZATION: ICD-10-CM

## 2024-11-25 DIAGNOSIS — F41.1 GAD (GENERALIZED ANXIETY DISORDER): ICD-10-CM

## 2024-11-25 PROCEDURE — 99214 OFFICE O/P EST MOD 30 MIN: CPT | Performed by: NURSE PRACTITIONER

## 2024-11-25 PROCEDURE — 90471 IMMUNIZATION ADMIN: CPT

## 2024-11-25 PROCEDURE — 90656 IIV3 VACC NO PRSV 0.5 ML IM: CPT

## 2024-11-25 RX ORDER — TOPIRAMATE 25 MG/1
TABLET, FILM COATED ORAL
Qty: 60 TABLET | Refills: 1 | Status: SHIPPED | OUTPATIENT
Start: 2024-11-25

## 2024-11-25 NOTE — ASSESSMENT & PLAN NOTE
ABNER-7=4  Overall controlled.   She would like to stay on 10 mg Fluoxetine.   She will call with any worsening.   F/U in 6 months.

## 2024-11-25 NOTE — ASSESSMENT & PLAN NOTE
About 10 migraines/month.   Good effect with imitrex.   Will have her start Topamax as preventative.   She will f/u with neurology in January as scheduled.   Orders:    topiramate (Topamax) 25 mg tablet; Take one tablet by mouth daily at bedtime. Can increase to AM and PM in one week if not effective.

## 2024-11-25 NOTE — PROGRESS NOTES
Name: Morena Márquez      : 2000      MRN: [id number]  Encounter Provider: CHULA Roa  Encounter Date: 2024   Encounter department: [facility] CARE [address]   :  Assessment & Plan  Current mild episode of major depressive disorder without prior episode (HCC)  PHQ-9=0  Well controlled.   Continue on current regimen.   F/U in 6 months.          ABNER (generalized anxiety disorder)  ABNER-7=4  Overall controlled.   She would like to stay on 10 mg Fluoxetine.   She will call with any worsening.   F/U in 6 months.        Migraine without aura and without status migrainosus, not intractable  About 10 migraines/month.   Good effect with imitrex.   Will have her start Topamax as preventative.   She will f/u with neurology in January as scheduled.   Orders:    topiramate (Topamax) 25 mg tablet; Take one tablet by mouth daily at bedtime. Can increase to AM and PM in one week if not effective.    Encounter for immunization    Orders:    influenza vaccine preservative-free 0.5 mL IM (Fluzone, Afluria, Fluarix, Flulaval)           History of Present Illness     Depression is good. There was period where she stopped the medicatiuon and depressive symptoms worsened. Restarted and improved again. Does report some anxiety around stress of school. Wants to stay on 10 mg dose.     Recently diagnosed with migraines. Gets about 10 migraines/month which is better and not lasting as long. Will get associated nausea, photophobia. No aura. Has been taking Imitrex at onset which helps stop migraine. Was having medication overuse headaches so she stopped the OTC medications. Has appointment with neurology in January.       Review of Systems   Constitutional:  Negative for appetite change and fatigue.   Eyes:  Negative for visual disturbance.   Neurological:  Positive for headaches. Negative for weakness.   Psychiatric/Behavioral:  Positive for agitation. Negative for decreased concentration,  "dysphoric mood, sleep disturbance and suicidal ideas. The patient is nervous/anxious.           Objective   /66 (Patient Position: Sitting, Cuff Size: Standard)   Pulse 74   Temp 98.2 °F (36.8 °C) (Tympanic)   Ht 5' 5.25\" (1.657 m)   Wt 67.6 kg (149 lb)   SpO2 99%   BMI 24.61 kg/m²      Physical Exam  Vitals reviewed.   Constitutional:       Appearance: Normal appearance.   Cardiovascular:      Rate and Rhythm: Normal rate and regular rhythm.      Heart sounds: Normal heart sounds. No murmur heard.  Pulmonary:      Effort: Pulmonary effort is normal.      Breath sounds: Normal breath sounds.   Skin:     General: Skin is warm and dry.   Neurological:      Mental Status: She is alert and oriented to person, place, and time.   Psychiatric:         Mood and Affect: Mood normal.         Behavior: Behavior normal.         Thought Content: Thought content normal.         Judgment: Judgment normal.         "

## 2024-12-16 DIAGNOSIS — Z30.41 ENCOUNTER FOR SURVEILLANCE OF CONTRACEPTIVE PILLS: ICD-10-CM

## 2024-12-17 RX ORDER — NORETHINDRONE ACETATE AND ETHINYL ESTRADIOL 1.5-30(21)
1 KIT ORAL DAILY
Qty: 84 TABLET | Refills: 1 | Status: SHIPPED | OUTPATIENT
Start: 2024-12-17

## 2025-02-06 DIAGNOSIS — J06.9 UPPER RESPIRATORY TRACT INFECTION, UNSPECIFIED TYPE: Primary | ICD-10-CM

## 2025-02-06 RX ORDER — AZITHROMYCIN 250 MG/1
TABLET, FILM COATED ORAL
Qty: 6 TABLET | Refills: 0 | Status: SHIPPED | OUTPATIENT
Start: 2025-02-06 | End: 2025-02-10

## 2025-02-06 RX ORDER — BENZONATATE 100 MG/1
100 CAPSULE ORAL 3 TIMES DAILY PRN
Qty: 20 CAPSULE | Refills: 0 | Status: SHIPPED | OUTPATIENT
Start: 2025-02-06

## 2025-02-26 DIAGNOSIS — G43.009 MIGRAINE WITHOUT AURA AND WITHOUT STATUS MIGRAINOSUS, NOT INTRACTABLE: ICD-10-CM

## 2025-02-26 DIAGNOSIS — F32.0 CURRENT MILD EPISODE OF MAJOR DEPRESSIVE DISORDER WITHOUT PRIOR EPISODE (HCC): ICD-10-CM

## 2025-02-26 DIAGNOSIS — F41.1 GAD (GENERALIZED ANXIETY DISORDER): ICD-10-CM

## 2025-02-26 DIAGNOSIS — Z30.41 ENCOUNTER FOR SURVEILLANCE OF CONTRACEPTIVE PILLS: ICD-10-CM

## 2025-02-27 RX ORDER — NORETHINDRONE ACETATE AND ETHINYL ESTRADIOL 1.5-30(21)
1 KIT ORAL DAILY
Qty: 84 TABLET | Refills: 0 | OUTPATIENT
Start: 2025-02-27

## 2025-02-27 RX ORDER — TOPIRAMATE 25 MG/1
TABLET, FILM COATED ORAL
Qty: 60 TABLET | Refills: 5 | Status: SHIPPED | OUTPATIENT
Start: 2025-02-27

## 2025-02-27 RX ORDER — FLUOXETINE 10 MG/1
20 TABLET, FILM COATED ORAL DAILY
Qty: 180 TABLET | Refills: 1 | Status: SHIPPED | OUTPATIENT
Start: 2025-02-27

## 2025-03-06 NOTE — ASSESSMENT & PLAN NOTE
She reports mostly anxiety that is has been getting out of control  Some mild depressive symptoms  We discussed treatment options/AE/risks/benefits at length  She is willing to try lexapro  Start at 10 mg dose  She will be starting therapy tomorrow  We discussed increased risk of SI in her age group  Stop med and call if this occurs  F/U in 4 weeks  no

## 2025-03-10 DIAGNOSIS — F32.0 CURRENT MILD EPISODE OF MAJOR DEPRESSIVE DISORDER WITHOUT PRIOR EPISODE (HCC): ICD-10-CM

## 2025-03-10 DIAGNOSIS — F41.1 GAD (GENERALIZED ANXIETY DISORDER): ICD-10-CM

## 2025-03-11 RX ORDER — FLUOXETINE 10 MG/1
20 TABLET, FILM COATED ORAL DAILY
Qty: 180 TABLET | Refills: 1 | Status: SHIPPED | OUTPATIENT
Start: 2025-03-11

## 2025-03-12 DIAGNOSIS — Z30.41 ENCOUNTER FOR SURVEILLANCE OF CONTRACEPTIVE PILLS: ICD-10-CM

## 2025-03-12 RX ORDER — NORETHINDRONE ACETATE AND ETHINYL ESTRADIOL 1.5-30(21)
1 KIT ORAL DAILY
Qty: 28 TABLET | Refills: 0 | Status: SHIPPED | OUTPATIENT
Start: 2025-03-12

## 2025-03-12 NOTE — TELEPHONE ENCOUNTER
Reason for call:   [x] Refill   [] Prior Auth  [x] Other: Not a Duplicate - Change of Pharmacy    Office:   [] PCP/Provider -   [x] Specialty/Provider - [facility] - Danette Robins MD     Medication:   norethindrone-ethinyl estradiol-iron (Dontrell Fe 1.5/30) 1.5-30 MG-MCG tablet    Dose/Frequency: Take 1 tablet by mouth daily     Quantity: 84 tablet    Pharmacy: [facility] [id number] [address] [phone]    Local Pharmacy   Does the patient have enough for 3 days?   [x] Yes   [] No - Send as HP to POD    Mail Away Pharmacy   Does the patient have enough for 10 days?   [] Yes   [] No - Send as HP to POD

## 2025-03-18 DIAGNOSIS — J06.9 UPPER RESPIRATORY TRACT INFECTION, UNSPECIFIED TYPE: Primary | ICD-10-CM

## 2025-03-18 RX ORDER — DOXYCYCLINE 100 MG/1
100 CAPSULE ORAL EVERY 12 HOURS SCHEDULED
Qty: 10 CAPSULE | Refills: 0 | Status: SHIPPED | OUTPATIENT
Start: 2025-03-18 | End: 2025-03-23

## 2025-03-28 ENCOUNTER — ANNUAL EXAM (OUTPATIENT)
Age: 25
End: 2025-03-28
Payer: COMMERCIAL

## 2025-03-28 ENCOUNTER — TELEPHONE (OUTPATIENT)
Age: 25
End: 2025-03-28

## 2025-03-28 VITALS
HEIGHT: 65 IN | WEIGHT: 149.6 LBS | DIASTOLIC BLOOD PRESSURE: 68 MMHG | SYSTOLIC BLOOD PRESSURE: 110 MMHG | BODY MASS INDEX: 24.93 KG/M2

## 2025-03-28 DIAGNOSIS — Z30.41 ENCOUNTER FOR SURVEILLANCE OF CONTRACEPTIVE PILLS: ICD-10-CM

## 2025-03-28 DIAGNOSIS — Z12.4 SCREENING FOR CERVICAL CANCER: ICD-10-CM

## 2025-03-28 DIAGNOSIS — Z11.3 SCREENING FOR STD (SEXUALLY TRANSMITTED DISEASE): ICD-10-CM

## 2025-03-28 DIAGNOSIS — Z01.419 ENCOUNTER FOR ANNUAL ROUTINE GYNECOLOGICAL EXAMINATION: Primary | ICD-10-CM

## 2025-03-28 PROCEDURE — 87491 CHLMYD TRACH DNA AMP PROBE: CPT | Performed by: OBSTETRICS & GYNECOLOGY

## 2025-03-28 PROCEDURE — G0145 SCR C/V CYTO,THINLAYER,RESCR: HCPCS | Performed by: OBSTETRICS & GYNECOLOGY

## 2025-03-28 PROCEDURE — 87591 N.GONORRHOEAE DNA AMP PROB: CPT | Performed by: OBSTETRICS & GYNECOLOGY

## 2025-03-28 PROCEDURE — 99395 PREV VISIT EST AGE 18-39: CPT | Performed by: OBSTETRICS & GYNECOLOGY

## 2025-03-28 RX ORDER — NORETHINDRONE ACETATE AND ETHINYL ESTRADIOL 1.5-30(21)
1 KIT ORAL DAILY
Qty: 84 TABLET | Refills: 3 | Status: SHIPPED | OUTPATIENT
Start: 2025-03-28

## 2025-03-28 RX ORDER — PROPRANOLOL HYDROCHLORIDE 80 MG/1
1 CAPSULE, EXTENDED RELEASE ORAL DAILY
COMMUNITY
Start: 2025-03-06

## 2025-03-28 NOTE — PROGRESS NOTES
Assessment/Plan:    1. Encounter for annual routine gynecological examination (Primary)      2. Screening for STD (sexually transmitted disease)    - Chlamydia/GC amplified DNA by PCR    3. Screening for cervical cancer    - Liquid-based pap, screening    4. Encounter for surveillance of contraceptive pills    - norethindrone-ethinyl estradiol-iron (Dontrell Fe 1.5/30) 1.5-30 MG-MCG tablet; Take 1 tablet by mouth daily  Dispense: 84 tablet; Refill: 3    Discussed OTC hydrocortisone for fissures; discussed avoidance of irritants    Subjective      Morena Márquez is a 24 y.o. female who presents for annual exam. Periods are well-controlled on OCP.   She denies any breast, urinary or sexual concerns. Notes occasional perineal fissuring.    Current contraception: OCP  Regular self breast exam: yes  History of abnormal mammogram: no  History of abnormal lipids: no    Menstrual History:  Nulligravida  Menarche age: 15  Patient's last menstrual period was 03/11/2025 (exact date).  Period Cycle (Days): 28  Period Duration (Days): 4  Period Pattern: Regular  Menstrual Flow: Light  Menstrual Control: Tampon  Menstrual Control Change Freq (Hours): 3-4    Past Medical History:   Diagnosis Date    Anxiety     Closed dislocation of left patella 06/20/2019    [facility] 10/19 No surgery recommended. PT, return in 8 weeks    Migraine        Family History   Problem Relation Age of Onset    Migraines Mother     Hyperlipidemia Father     Seizures Father     Atrial fibrillation Father     No Known Problems Brother     Asthma Maternal Grandmother     Anxiety disorder Maternal Grandmother     Asthma Maternal Grandfather     Clotting disorder Maternal Grandfather     Heart disease Maternal Grandfather     Leukemia Maternal Grandfather     Osteoporosis Maternal Grandfather     Depression Paternal Grandmother     Hypertension Paternal Grandfather     Thyroid disease Paternal Grandfather     Depression Other     Diabetes Other      "Vesicoureteral reflux Other     Colon polyps Neg Hx     Colon cancer Neg Hx        The following portions of the patient's history were reviewed and updated as appropriate: allergies, current medications, past family history, past medical history, past social history, past surgical history, and problem list.    Review of Systems  Pertinent items are noted in HPI.      Objective      /68   Ht 5' 5\" (1.651 m)   Wt 67.9 kg (149 lb 9.6 oz)   LMP 03/11/2025 (Exact Date)   BMI 24.89 kg/m²     General:   alert and oriented, in no acute distress   Heart:  Breasts: regular rate and rhythm  appear normal, no suspicious masses, no skin or nipple changes or axillary nodes.   Lungs: Effort normal   Abdomen: soft, non-tender, without masses or organomegaly   Vulva: Normal; healed perineal fissures   Vagina: normal mucosa   Cervix: no lesions   Uterus: normal size, mobile, non-tender   Adnexa:  Bladder: normal adnexa and no mass, fullness, tenderness  Non-tender               "

## 2025-03-28 NOTE — TELEPHONE ENCOUNTER
Lov with old neurologist in 01/09/2024 w/ Dr.Jackle whitfield/ [facility]. Pt is not seeking a second opinion just a different doctor. Pt is scheduled with her preference of Maria Esther Briseno on 04/29/2025 @ 1pm.

## 2025-04-01 LAB
C TRACH DNA SPEC QL NAA+PROBE: NEGATIVE
N GONORRHOEA DNA SPEC QL NAA+PROBE: NEGATIVE

## 2025-04-02 ENCOUNTER — RESULTS FOLLOW-UP (OUTPATIENT)
Age: 25
End: 2025-04-02

## 2025-04-02 DIAGNOSIS — G43.711 INTRACTABLE CHRONIC MIGRAINE WITHOUT AURA AND WITH STATUS MIGRAINOSUS: ICD-10-CM

## 2025-04-02 LAB
LAB AP GYN PRIMARY INTERPRETATION: NORMAL
Lab: NORMAL
PATH INTERP SPEC-IMP: NORMAL

## 2025-04-02 RX ORDER — SUMATRIPTAN SUCCINATE 25 MG/1
25 TABLET ORAL ONCE AS NEEDED
Qty: 9 TABLET | Refills: 1 | Status: SHIPPED | OUTPATIENT
Start: 2025-04-02

## 2025-04-29 ENCOUNTER — OFFICE VISIT (OUTPATIENT)
Dept: NEUROLOGY | Facility: CLINIC | Age: 25
End: 2025-04-29
Payer: COMMERCIAL

## 2025-04-29 VITALS
RESPIRATION RATE: 14 BRPM | TEMPERATURE: 97.6 F | OXYGEN SATURATION: 98 % | HEIGHT: 65 IN | SYSTOLIC BLOOD PRESSURE: 112 MMHG | BODY MASS INDEX: 24.49 KG/M2 | DIASTOLIC BLOOD PRESSURE: 72 MMHG | WEIGHT: 147 LBS | HEART RATE: 72 BPM

## 2025-04-29 DIAGNOSIS — G43.709 CHRONIC MIGRAINE WITHOUT AURA WITHOUT STATUS MIGRAINOSUS, NOT INTRACTABLE: Primary | ICD-10-CM

## 2025-04-29 PROCEDURE — 96372 THER/PROPH/DIAG INJ SC/IM: CPT | Performed by: PHYSICIAN ASSISTANT

## 2025-04-29 PROCEDURE — 99204 OFFICE O/P NEW MOD 45 MIN: CPT | Performed by: PHYSICIAN ASSISTANT

## 2025-04-29 RX ORDER — NORTRIPTYLINE HYDROCHLORIDE 10 MG/1
CAPSULE ORAL
Qty: 90 CAPSULE | Refills: 1 | Status: SHIPPED | OUTPATIENT
Start: 2025-04-29

## 2025-04-29 RX ORDER — SUMATRIPTAN 50 MG/1
TABLET, FILM COATED ORAL
COMMUNITY
Start: 2025-04-10 | End: 2025-04-29

## 2025-04-29 RX ORDER — RIZATRIPTAN BENZOATE 10 MG/1
TABLET, ORALLY DISINTEGRATING ORAL
Qty: 9 TABLET | Refills: 2 | Status: SHIPPED | OUTPATIENT
Start: 2025-04-29

## 2025-04-29 RX ORDER — KETOROLAC TROMETHAMINE 10 MG/1
10 TABLET, FILM COATED ORAL EVERY 6 HOURS PRN
Qty: 10 TABLET | Refills: 0 | Status: SHIPPED | OUTPATIENT
Start: 2025-04-29

## 2025-04-29 RX ORDER — PROPRANOLOL HYDROCHLORIDE 60 MG/1
CAPSULE, EXTENDED RELEASE ORAL
COMMUNITY
Start: 2025-03-06 | End: 2025-05-01

## 2025-04-29 RX ORDER — MAGNESIUM 30 MG
30 TABLET ORAL 2 TIMES DAILY
COMMUNITY

## 2025-04-29 RX ORDER — KETOROLAC TROMETHAMINE 30 MG/ML
30 INJECTION, SOLUTION INTRAMUSCULAR; INTRAVENOUS ONCE
Status: COMPLETED | OUTPATIENT
Start: 2025-04-29 | End: 2025-04-29

## 2025-04-29 RX ORDER — ONDANSETRON 4 MG/1
TABLET, ORALLY DISINTEGRATING ORAL
COMMUNITY
Start: 2025-04-10

## 2025-04-29 RX ADMIN — KETOROLAC TROMETHAMINE 30 MG: 30 INJECTION, SOLUTION INTRAMUSCULAR; INTRAVENOUS at 14:00

## 2025-04-29 NOTE — PATIENT INSTRUCTIONS
"Headache management instructions  - When patient has a moderate to severe headache, they should seek rest, initiate relaxation and apply cold compresses to the head.   - Maintain regular sleep schedule. Adults need at least 7-8 hours of uninterrupted a night.   - Limit over the counter medications such as Tylenol, Ibuprofen, Aleve, Excedrin. (No more than 2- 3 times a week or max 10 a month).  - Maintain headache diary.  Free FLOR for a smart phone, which can be used is \"Migraine dania\"  - Limit caffeine to 1-2 cups 8 to 16 oz a day or less.  - Avoid dietary trigger. (aged cheese, peanuts, MSG, aspartame and nitrates).  - Patient is to have regular frequent meals to prevent headache onset.    - Please drink at least 64 ounces of water a day to help remain hydrated.    "

## 2025-04-29 NOTE — PROGRESS NOTES
Name: Morena Márquez      : 2000      MRN: [id number]  Encounter Provider: Maria Esther Briseno PA-C  Encounter Date: 2025   Encounter department: [facility] [place]  :  Assessment & Plan  Chronic migraine without aura without status migrainosus, not intractable  The patient is having frequent migraine headaches.  I recommended to keep a journal to track any other triggers or patterns that she is not aware of.  Avoiding triggers if possible.  She was agreeable to try nortriptyline, low dose as she is already on fluoxetine.  In my opinion will be fine to use together as long as she keeps the dose of both of those low, side effects reviewed.  She has tried and failed both Topamax and propranolol, therefore is a good candidate for Botox injections or CGRP injectables.  Will consider these.  Need to get her through the last few weeks of college first and then after she graduates she will contact me if interested in Botox.  Would like to manage migraines better prior to starting Botox since Botox can possibly exacerbate migraines initially.  Labs ordered to rule out reversible causes.    Also recommended Nurtec as needed, Toradol injection today to break current cycle.    Orders:    rizatriptan (MAXALT-MLT) 10 mg disintegrating tablet; 1 tab at migraine onset, repeat after 2 hours if needed; Max 2 per day and 3 per week.    rimegepant sulfate (NURTEC) 75 mg TBDP; 1 tab prn migraine. No more than one dose per 24 hours.    nortriptyline (PAMELOR) 10 mg capsule; 1 tab q.h.s.    ketorolac (TORADOL) 60 mg/2 mL IM injection 30 mg    Vitamin B12; Future    Vitamin D 25 hydroxy; Future    Comprehensive metabolic panel; Future    CBC and differential; Future    TSH, 3rd generation with Free T4 reflex; Future    Folate; Future    Heavy metals, blood; Future    Lyme Total AB W Reflex to IGM/IGG; Future    ketorolac (TORADOL) 10 mg tablet; Take 1 tablet (10 mg total) by mouth every 6 (six) hours as  "needed (migraine) Max 2-3 per week.          Patient Instructions   Headache management instructions  - When patient has a moderate to severe headache, they should seek rest, initiate relaxation and apply cold compresses to the head.   - Maintain regular sleep schedule. Adults need at least 7-8 hours of uninterrupted a night.   - Limit over the counter medications such as Tylenol, Ibuprofen, Aleve, Excedrin. (No more than 2- 3 times a week or max 10 a month).  - Maintain headache diary.  Free FLOR for a smart phone, which can be used is \"Migraine dania\"  - Limit caffeine to 1-2 cups 8 to 16 oz a day or less.  - Avoid dietary trigger. (aged cheese, peanuts, MSG, aspartame and nitrates).  - Patient is to have regular frequent meals to prevent headache onset.    - Please drink at least 64 ounces of water a day to help remain hydrated.         The patient should not hesitate to call me prior to her follow up with any questions or concerns.      History of Present Illness   HPI     Ms. Morena Márquez is here with her mom for neurological consultation for migraine headaches.    She goes to [employer] [facility] for nutrition.  Had brain mri- August 2024, states within normal limits, done at a different facility.    Migraines/headaches:  Onset: 24 years old, her mom adds that she has always had headaches however they started to become worse in the last 1 to 2 years during college.  They  were thinking about checking the apartment for mold.  She is not sure what is triggering them.  They are not menstrually triggered.  Recently they have been debilitating and she is not functional when she gets 1.  Head injury: None  Current pain: 1-2/10  Reaches pain level at worst: 9-10/10  Frequency: 4 to 5/week, almost daily  Duration: The whole day  Location: Bitemporal, bifrontal, occipital bilaterally  Quality: Throbbing, pressure, pulsing, achy  She typically misses 3 school days per month on average due to a headache in 5 social or " family activities on average per month.  Associated with: Nausea, concentration problems, sore/stiff neck, prefers a quiet and dark room, light sensitivity  Triggers: Stress, menstruation, sunlight, fatigue, alcohol, chocolate  Aura/ warning:    Medications tried:  Prevention-  Magnesium  Topamax- major brain fog  Propranolol- for 1 week, heart rate was too low  Fluoxetine    Abortive-  Ondansetron  Ibuprofen  Toradol injection in the ER  Acetaminophen  Sumatriptan  Excedrin    Other non-medication therapies or treatments- chiropractor, acupuncture, sleep    Neck pain and description: super stiff, a/w the migraine  Sleep concerns:  Family planning: No, on Dontrell FE 1.5/30    Family hx of migraines: Yes mom and dad, mom had them starting in the third grade  Family hx of cerebral aneurysms: None    Lifestyle:  Hydration- 1-2- 32 oz  Caffeine- coffee, energy drinks- all in the AM, occasional soda      Review of Systems   Constitutional:  Negative for appetite change, fatigue and fever.   HENT: Negative.  Negative for hearing loss, tinnitus, trouble swallowing and voice change.    Eyes:  Positive for visual disturbance. Negative for photophobia and pain.   Respiratory: Negative.  Negative for shortness of breath.    Cardiovascular: Negative.  Negative for palpitations.   Gastrointestinal:  Positive for nausea and vomiting.   Endocrine: Negative.  Negative for cold intolerance.   Genitourinary: Negative.  Negative for dysuria, frequency and urgency.   Musculoskeletal:  Positive for back pain, neck pain and neck stiffness. Negative for gait problem and myalgias.   Skin: Negative.  Negative for rash.   Allergic/Immunologic: Negative.    Neurological:  Positive for headaches (since last aug every day). Negative for dizziness, tremors, seizures, syncope, facial asymmetry, speech difficulty, weakness, light-headedness and numbness.   Hematological: Negative.  Does not bruise/bleed easily.   Psychiatric/Behavioral:  Positive  for sleep disturbance. Negative for confusion and hallucinations.    All other systems reviewed and are negative.   I have personally reviewed the MA's review of systems and made changes as necessary.    Pertinent Medical History            Medical History Reviewed by provider this encounter:  Tobacco  Allergies  Meds  Problems  Med Hx  Surg Hx  Fam Hx     .  Past Medical History   Past Medical History:   Diagnosis Date    Anxiety     Closed dislocation of left patella 06/20/2019    [facility] 10/19 No surgery recommended. PT, return in 8 weeks    Migraine      History reviewed. No pertinent surgical history.  Family History   Problem Relation Age of Onset    Migraines Mother     Hyperlipidemia Father     Seizures Father     Atrial fibrillation Father     No Known Problems Brother     Asthma Maternal Grandmother     Anxiety disorder Maternal Grandmother     Asthma Maternal Grandfather     Clotting disorder Maternal Grandfather     Heart disease Maternal Grandfather     Leukemia Maternal Grandfather     Osteoporosis Maternal Grandfather     Depression Paternal Grandmother     Hypertension Paternal Grandfather     Thyroid disease Paternal Grandfather     Depression Other     Diabetes Other     Vesicoureteral reflux Other     Colon polyps Neg Hx     Colon cancer Neg Hx       reports that she has never smoked. She has never used smokeless tobacco. She reports that she does not currently use alcohol. She reports that she does not use drugs.  Current Outpatient Medications   Medication Instructions    FLUoxetine 20 mg, Oral, Daily    Ibuprofen (IBU PO) Take by mouth    ketorolac (TORADOL) 10 mg, Oral, Every 6 hours PRN, Max 2-3 per week.    magnesium 30 mg, 2 times daily    norethindrone-ethinyl estradiol-iron (Dontrell Fe 1.5/30) 1.5-30 MG-MCG tablet 1 tablet, Oral, Daily    nortriptyline (PAMELOR) 10 mg capsule 1 tab q.h.s.    ondansetron (ZOFRAN-ODT) 4 mg disintegrating tablet take 1 tablet by mouth every 6  "hours as needed for nausea and vomiting    rimegepant sulfate (NURTEC) 75 mg TBDP 1 tab prn migraine. No more than one dose per 24 hours.    rizatriptan (MAXALT-MLT) 10 mg disintegrating tablet 1 tab at migraine onset, repeat after 2 hours if needed; Max 2 per day and 3 per week.   No Known Allergies   Current Outpatient Medications on File Prior to Visit   Medication Sig Dispense Refill    FLUoxetine 10 MG tablet Take 2 tablets (20 mg total) by mouth daily 180 tablet 1    Ibuprofen (IBU PO) Take by mouth      magnesium 30 MG tablet Take 30 mg by mouth 2 (two) times a day      norethindrone-ethinyl estradiol-iron (Dontrell Fe 1.5/30) 1.5-30 MG-MCG tablet Take 1 tablet by mouth daily 84 tablet 3    ondansetron (ZOFRAN-ODT) 4 mg disintegrating tablet take 1 tablet by mouth every 6 hours as needed for nausea and vomiting       No current facility-administered medications on file prior to visit.      Social History     Tobacco Use    Smoking status: Never    Smokeless tobacco: Never   Vaping Use    Vaping status: Never Used   Substance and Sexual Activity    Alcohol use: Not Currently     Comment: rare    Drug use: Never    Sexual activity: Yes     Partners: Male     Birth control/protection: Condom Male, OCP        Objective   /72 (BP Location: Right arm, Patient Position: Sitting, Cuff Size: Standard)   Pulse 72   Temp 97.6 °F (36.4 °C) (Temporal)   Resp 14   Ht 5' 5\" (1.651 m)   Wt 66.7 kg (147 lb)   SpO2 98%   BMI 24.46 kg/m²     Physical Exam  Neurological Exam  Vital signs reviewed.  Well developed, well nourished.  Mood and affect are pleasant.  Speech is fluent and articulate.  Head: Normocephalic, atraumatic  CN 2-12: intact and symmetric, including EOMs which are normal b/l and PERRL  MSK: 5/5 t/o. ROM normal x all 4 extr.   Sensation: Inact to LT and temp x4 extr. Romberg negative.  Reflexes: 2+ and symmetric in all 4 extr.  Coordination: Nml x4 extr.  Gait: Steady normal gait, tandem gait is " steady.      Radiology Results Review: I have reviewed radiology reports from 8/14/24 including: CT head.-- WNL    Administrative Statements   I have spent a total time of 45 minutes in caring for this patient on the day of the visit/encounter including Diagnostic results, Risks and benefits of tx options, Instructions for management, Patient and family education, Importance of tx compliance, Risk factor reductions, Impressions, Counseling / Coordination of care, Documenting in the medical record, Reviewing/placing orders in the medical record (including tests, medications, and/or procedures), and Obtaining or reviewing history  .

## 2025-04-30 ENCOUNTER — TELEPHONE (OUTPATIENT)
Dept: NEUROLOGY | Facility: CLINIC | Age: 25
End: 2025-04-30

## 2025-05-01 PROBLEM — G43.709 CHRONIC MIGRAINE WITHOUT AURA WITHOUT STATUS MIGRAINOSUS, NOT INTRACTABLE: Status: ACTIVE | Noted: 2025-05-01

## 2025-05-01 NOTE — ASSESSMENT & PLAN NOTE
The patient is having frequent migraine headaches.  I recommended to keep a journal to track any other triggers or patterns that she is not aware of.  Avoiding triggers if possible.  She was agreeable to try nortriptyline, low dose as she is already on fluoxetine.  In my opinion will be fine to use together as long as she keeps the dose of both of those low, side effects reviewed.  She has tried and failed both Topamax and propranolol, therefore is a good candidate for Botox injections or CGRP injectables.  Will consider these.  Need to get her through the last few weeks of college first and then after she graduates she will contact me if interested in Botox.  Would like to manage migraines better prior to starting Botox since Botox can possibly exacerbate migraines initially.  Labs ordered to rule out reversible causes.    Also recommended Nurtec as needed, Toradol injection today to break current cycle.    Orders:    rizatriptan (MAXALT-MLT) 10 mg disintegrating tablet; 1 tab at migraine onset, repeat after 2 hours if needed; Max 2 per day and 3 per week.    rimegepant sulfate (NURTEC) 75 mg TBDP; 1 tab prn migraine. No more than one dose per 24 hours.    nortriptyline (PAMELOR) 10 mg capsule; 1 tab q.h.s.    ketorolac (TORADOL) 60 mg/2 mL IM injection 30 mg    Vitamin B12; Future    Vitamin D 25 hydroxy; Future    Comprehensive metabolic panel; Future    CBC and differential; Future    TSH, 3rd generation with Free T4 reflex; Future    Folate; Future    Heavy metals, blood; Future    Lyme Total AB W Reflex to IGM/IGG; Future    ketorolac (TORADOL) 10 mg tablet; Take 1 tablet (10 mg total) by mouth every 6 (six) hours as needed (migraine) Max 2-3 per week.

## 2025-05-02 ENCOUNTER — PATIENT MESSAGE (OUTPATIENT)
Dept: NEUROLOGY | Facility: CLINIC | Age: 25
End: 2025-05-02

## 2025-05-02 DIAGNOSIS — G43.709 CHRONIC MIGRAINE WITHOUT AURA WITHOUT STATUS MIGRAINOSUS, NOT INTRACTABLE: Primary | ICD-10-CM

## 2025-05-04 RX ORDER — METHYLPREDNISOLONE 4 MG/1
TABLET ORAL
Qty: 21 EACH | Refills: 0 | Status: SHIPPED | OUTPATIENT
Start: 2025-05-04

## 2025-05-05 NOTE — TELEPHONE ENCOUNTER
Per [facility]-   [id number]. NURTEC TAB 75MG ODT is approved through 04/30/2027.     Approval letter scanned in media    Called [facility] pharmacy and left a message making them aware of the approval and to process 16 tabs per 30 days. Cb if any questions.

## 2025-05-07 ENCOUNTER — PATIENT MESSAGE (OUTPATIENT)
Dept: NEUROLOGY | Facility: CLINIC | Age: 25
End: 2025-05-07

## 2025-05-09 DIAGNOSIS — G43.709 CHRONIC MIGRAINE WITHOUT AURA WITHOUT STATUS MIGRAINOSUS, NOT INTRACTABLE: Primary | ICD-10-CM

## 2025-05-09 DIAGNOSIS — G43.709 CHRONIC MIGRAINE WITHOUT AURA WITHOUT STATUS MIGRAINOSUS, NOT INTRACTABLE: ICD-10-CM

## 2025-05-09 RX ORDER — DIVALPROEX SODIUM 250 MG/1
TABLET, DELAYED RELEASE ORAL
Qty: 5 TABLET | Refills: 0 | Status: SHIPPED | OUTPATIENT
Start: 2025-05-09

## 2025-05-12 RX ORDER — DIVALPROEX SODIUM 250 MG/1
TABLET, DELAYED RELEASE ORAL
Qty: 5 TABLET | Refills: 0 | OUTPATIENT
Start: 2025-05-12

## 2025-05-12 NOTE — PATIENT COMMUNICATION
Called pharm, states that script is for delayed release 1 tab at hs and Typically DR is ordered bid and extended release is daily.  They were wanting to confirm if we wanted delayed release.   Advised that this is being order to break a migraine and only prescribed for 5 days.    She will note this and will fill it.    [facility] message sent to pt

## 2025-05-12 NOTE — TELEPHONE ENCOUNTER
Pamela Ovalle to CHULA Juarez    5/9/25  4:11 PM  Refill must be reviewed and completed by the office or provider. The refill is unable to be approved or denied by the medication management team.            Pharmacy comment: Script Clarification:DELAYED RELEASE PRODUCT IS TYPICALLY DOSED TWICE DAILY, EXTENDED RELEASE IS ONCE DAILY PLEASE CONFIRM FORM.

## 2025-05-12 NOTE — TELEPHONE ENCOUNTER
I spoke to pharm.  Advised that this is being ordered to break a migraine for 5 days.  They will note this and will fill divalproex delayed release for pt.  Please refuse script

## 2025-05-30 ENCOUNTER — APPOINTMENT (OUTPATIENT)
Dept: LAB | Facility: HOSPITAL | Age: 25
End: 2025-05-30
Payer: COMMERCIAL

## 2025-05-30 DIAGNOSIS — G43.709 CHRONIC MIGRAINE WITHOUT AURA WITHOUT STATUS MIGRAINOSUS, NOT INTRACTABLE: ICD-10-CM

## 2025-05-30 LAB
25(OH)D3 SERPL-MCNC: 32.6 NG/ML (ref 30–100)
ALBUMIN SERPL BCG-MCNC: 4.4 G/DL (ref 3.5–5)
ALP SERPL-CCNC: 44 U/L (ref 34–104)
ALT SERPL W P-5'-P-CCNC: 12 U/L (ref 7–52)
ANION GAP SERPL CALCULATED.3IONS-SCNC: 7 MMOL/L (ref 4–13)
AST SERPL W P-5'-P-CCNC: 18 U/L (ref 13–39)
BASOPHILS # BLD AUTO: 0.02 THOUSANDS/ÂΜL (ref 0–0.1)
BASOPHILS NFR BLD AUTO: 0 % (ref 0–1)
BILIRUB SERPL-MCNC: 0.4 MG/DL (ref 0.2–1)
BUN SERPL-MCNC: 12 MG/DL (ref 5–25)
CALCIUM SERPL-MCNC: 9.3 MG/DL (ref 8.4–10.2)
CHLORIDE SERPL-SCNC: 102 MMOL/L (ref 96–108)
CO2 SERPL-SCNC: 28 MMOL/L (ref 21–32)
CREAT SERPL-MCNC: 0.69 MG/DL (ref 0.6–1.3)
EOSINOPHIL # BLD AUTO: 0.1 THOUSAND/ÂΜL (ref 0–0.61)
EOSINOPHIL NFR BLD AUTO: 2 % (ref 0–6)
ERYTHROCYTE [DISTWIDTH] IN BLOOD BY AUTOMATED COUNT: 12.3 % (ref 11.6–15.1)
FERRITIN SERPL-MCNC: 26 NG/ML (ref 30–307)
FOLATE SERPL-MCNC: 21.4 NG/ML
GFR SERPL CREATININE-BSD FRML MDRD: 123 ML/MIN/1.73SQ M
GLUCOSE P FAST SERPL-MCNC: 86 MG/DL (ref 65–99)
HCT VFR BLD AUTO: 43 % (ref 34.8–46.1)
HGB BLD-MCNC: 14 G/DL (ref 11.5–15.4)
IMM GRANULOCYTES # BLD AUTO: 0.02 THOUSAND/UL (ref 0–0.2)
IMM GRANULOCYTES NFR BLD AUTO: 0 % (ref 0–2)
IRON SATN MFR SERPL: 35 % (ref 15–50)
IRON SERPL-MCNC: 152 UG/DL (ref 50–212)
LYMPHOCYTES # BLD AUTO: 1.47 THOUSANDS/ÂΜL (ref 0.6–4.47)
LYMPHOCYTES NFR BLD AUTO: 30 % (ref 14–44)
MCH RBC QN AUTO: 29.4 PG (ref 26.8–34.3)
MCHC RBC AUTO-ENTMCNC: 32.6 G/DL (ref 31.4–37.4)
MCV RBC AUTO: 90 FL (ref 82–98)
MONOCYTES # BLD AUTO: 0.33 THOUSAND/ÂΜL (ref 0.17–1.22)
MONOCYTES NFR BLD AUTO: 7 % (ref 4–12)
NEUTROPHILS # BLD AUTO: 3.03 THOUSANDS/ÂΜL (ref 1.85–7.62)
NEUTS SEG NFR BLD AUTO: 61 % (ref 43–75)
NRBC BLD AUTO-RTO: 0 /100 WBCS
PLATELET # BLD AUTO: 314 THOUSANDS/UL (ref 149–390)
PMV BLD AUTO: 9.6 FL (ref 8.9–12.7)
POTASSIUM SERPL-SCNC: 4.2 MMOL/L (ref 3.5–5.3)
PROT SERPL-MCNC: 7.5 G/DL (ref 6.4–8.4)
RBC # BLD AUTO: 4.77 MILLION/UL (ref 3.81–5.12)
SODIUM SERPL-SCNC: 137 MMOL/L (ref 135–147)
TIBC SERPL-MCNC: 434 UG/DL (ref 250–450)
TRANSFERRIN SERPL-MCNC: 310 MG/DL (ref 203–362)
TSH SERPL DL<=0.05 MIU/L-ACNC: 1.39 UIU/ML (ref 0.45–4.5)
UIBC SERPL-MCNC: 282 UG/DL (ref 155–355)
VIT B12 SERPL-MCNC: 295 PG/ML (ref 180–914)
WBC # BLD AUTO: 4.97 THOUSAND/UL (ref 4.31–10.16)

## 2025-05-30 PROCEDURE — 84443 ASSAY THYROID STIM HORMONE: CPT

## 2025-05-30 PROCEDURE — 36415 COLL VENOUS BLD VENIPUNCTURE: CPT

## 2025-05-30 PROCEDURE — 83540 ASSAY OF IRON: CPT

## 2025-05-30 PROCEDURE — 83655 ASSAY OF LEAD: CPT

## 2025-05-30 PROCEDURE — 86618 LYME DISEASE ANTIBODY: CPT

## 2025-05-30 PROCEDURE — 83550 IRON BINDING TEST: CPT

## 2025-05-30 PROCEDURE — 82175 ASSAY OF ARSENIC: CPT

## 2025-05-30 PROCEDURE — 82728 ASSAY OF FERRITIN: CPT

## 2025-05-30 PROCEDURE — 83825 ASSAY OF MERCURY: CPT

## 2025-05-30 PROCEDURE — 82607 VITAMIN B-12: CPT

## 2025-05-30 PROCEDURE — 80053 COMPREHEN METABOLIC PANEL: CPT

## 2025-05-30 PROCEDURE — 85025 COMPLETE CBC W/AUTO DIFF WBC: CPT

## 2025-05-30 PROCEDURE — 82306 VITAMIN D 25 HYDROXY: CPT

## 2025-05-30 PROCEDURE — 82746 ASSAY OF FOLIC ACID SERUM: CPT

## 2025-05-31 LAB — B BURGDOR IGG+IGM SER QL IA: NEGATIVE

## 2025-06-02 ENCOUNTER — RESULTS FOLLOW-UP (OUTPATIENT)
Dept: NEUROLOGY | Facility: CLINIC | Age: 25
End: 2025-06-02

## 2025-06-05 LAB
ARSENIC BLD-MCNC: 1 UG/L (ref 0–9)
LEAD BLD-MCNC: <1 UG/DL (ref 0–3.4)
MERCURY BLD-MCNC: <1 UG/L (ref 0–14.9)

## 2025-06-23 DIAGNOSIS — G43.009 MIGRAINE WITHOUT AURA AND WITHOUT STATUS MIGRAINOSUS, NOT INTRACTABLE: Primary | ICD-10-CM

## 2025-06-23 DIAGNOSIS — G43.709 CHRONIC MIGRAINE WITHOUT AURA WITHOUT STATUS MIGRAINOSUS, NOT INTRACTABLE: ICD-10-CM

## 2025-06-23 RX ORDER — ONABOTULINUMTOXINA 200 [USP'U]/1
200 INJECTION, POWDER, LYOPHILIZED, FOR SOLUTION INTRADERMAL; INTRAMUSCULAR
Qty: 1 EACH | Refills: 3 | Status: SHIPPED | OUTPATIENT
Start: 2025-06-23

## 2025-06-23 NOTE — TELEPHONE ENCOUNTER
Good afternoon,     Please review attached request for Botox 200 units and authorize refill if you agree.     Thanks,  Leslye Mccoy/amando  06/23/25  2:15 PM

## 2025-06-24 DIAGNOSIS — Z30.41 ENCOUNTER FOR SURVEILLANCE OF CONTRACEPTIVE PILLS: ICD-10-CM

## 2025-06-24 RX ORDER — NORETHINDRONE ACETATE AND ETHINYL ESTRADIOL 1.5-30(21)
1 KIT ORAL DAILY
Qty: 84 TABLET | Refills: 1 | Status: SHIPPED | OUTPATIENT
Start: 2025-06-24

## 2025-06-25 DIAGNOSIS — G43.709 CHRONIC MIGRAINE WITHOUT AURA WITHOUT STATUS MIGRAINOSUS, NOT INTRACTABLE: ICD-10-CM

## 2025-06-25 RX ORDER — KETOROLAC TROMETHAMINE 10 MG/1
10 TABLET, FILM COATED ORAL EVERY 6 HOURS PRN
Qty: 10 TABLET | Refills: 0 | Status: SHIPPED | OUTPATIENT
Start: 2025-06-25

## 2025-07-01 ENCOUNTER — TELEPHONE (OUTPATIENT)
Dept: NEUROLOGY | Facility: CLINIC | Age: 25
End: 2025-07-01

## 2025-07-01 NOTE — TELEPHONE ENCOUNTER
Botox number of units: 200  Botox quantity: y  Arrived at what location: [facility]  Botox at Correct Administering Location: y  NDC number:[id number]  Lot number:[id number]  Expiration Date: 11/27  Appt notes indicate correct medication:  y

## 2025-07-02 ENCOUNTER — TELEPHONE (OUTPATIENT)
Dept: NEUROLOGY | Facility: CLINIC | Age: 25
End: 2025-07-02

## 2025-07-02 NOTE — TELEPHONE ENCOUNTER
Reason for call:   [x] Prior Auth  [] Other:     Caller:  [] Patient  [x] Pharmacy  Name:   Address:   Callback Number:       Ordering Provider:   [] PCP/Provider -   [x] Speciality/Provider - [facility]     Has the patient tried other medications and failed? If failed, which medications did they fail?    [] No   [] Yes -     Is the patient's insurance updated in EPIC?   [] Yes   [] No     Is a copy of the patient's insurance scanned in EPIC?   [] Yes   [] No

## 2025-07-06 DIAGNOSIS — G43.709 CHRONIC MIGRAINE WITHOUT AURA WITHOUT STATUS MIGRAINOSUS, NOT INTRACTABLE: ICD-10-CM

## 2025-07-09 ENCOUNTER — TELEPHONE (OUTPATIENT)
Dept: NEUROLOGY | Facility: CLINIC | Age: 25
End: 2025-07-09

## 2025-07-10 NOTE — TELEPHONE ENCOUNTER
Patient called in returning missed call from [facility] to schedule Botox appt.     Patient requesting a call back.

## 2025-07-14 NOTE — TELEPHONE ENCOUNTER
Patient called back to schedule BINJ appointment; secure chat sent to La and currently unavailable.  Advised will forward request and La will call back.    Please assist,    Thank you

## 2025-08-03 DIAGNOSIS — G43.709 CHRONIC MIGRAINE WITHOUT AURA WITHOUT STATUS MIGRAINOSUS, NOT INTRACTABLE: ICD-10-CM

## 2025-08-04 ENCOUNTER — PROCEDURE VISIT (OUTPATIENT)
Dept: NEUROLOGY | Facility: CLINIC | Age: 25
End: 2025-08-04
Payer: COMMERCIAL

## 2025-08-04 VITALS — DIASTOLIC BLOOD PRESSURE: 64 MMHG | TEMPERATURE: 98.4 F | SYSTOLIC BLOOD PRESSURE: 116 MMHG

## 2025-08-04 DIAGNOSIS — G43.709 CHRONIC MIGRAINE WITHOUT AURA WITHOUT STATUS MIGRAINOSUS, NOT INTRACTABLE: Primary | ICD-10-CM

## 2025-08-04 DIAGNOSIS — F41.1 GAD (GENERALIZED ANXIETY DISORDER): ICD-10-CM

## 2025-08-04 DIAGNOSIS — F32.0 CURRENT MILD EPISODE OF MAJOR DEPRESSIVE DISORDER WITHOUT PRIOR EPISODE (HCC): ICD-10-CM

## 2025-08-04 PROCEDURE — 64615 CHEMODENERV MUSC MIGRAINE: CPT | Performed by: PHYSICIAN ASSISTANT

## 2025-08-05 RX ORDER — RIZATRIPTAN BENZOATE 10 MG/1
TABLET, ORALLY DISINTEGRATING ORAL
Qty: 9 TABLET | Refills: 3 | Status: SHIPPED | OUTPATIENT
Start: 2025-08-05

## 2025-08-06 RX ORDER — FLUOXETINE 10 MG/1
20 TABLET, FILM COATED ORAL DAILY
Qty: 180 TABLET | Refills: 0 | Status: SHIPPED | OUTPATIENT
Start: 2025-08-06

## 2025-08-18 DIAGNOSIS — R51.9 INTRACTABLE HEADACHE, UNSPECIFIED CHRONICITY PATTERN, UNSPECIFIED HEADACHE TYPE: Primary | ICD-10-CM

## 2025-08-20 ENCOUNTER — DOCUMENTATION (OUTPATIENT)
Age: 25
End: 2025-08-20

## 2025-08-20 DIAGNOSIS — N39.0 UTI (URINARY TRACT INFECTION), UNCOMPLICATED: Primary | ICD-10-CM

## 2025-08-20 RX ORDER — CIPROFLOXACIN 250 MG/1
250 TABLET, FILM COATED ORAL EVERY 12 HOURS SCHEDULED
Qty: 6 TABLET | Refills: 0 | Status: SHIPPED | OUTPATIENT
Start: 2025-08-20 | End: 2025-08-23

## 2025-08-23 LAB
FERRITIN SERPL-MCNC: 106 NG/ML (ref 15–150)
VIT B12 SERPL-MCNC: 804 PG/ML (ref 232–1245)